# Patient Record
Sex: MALE | Race: BLACK OR AFRICAN AMERICAN | NOT HISPANIC OR LATINO | Employment: OTHER | URBAN - METROPOLITAN AREA
[De-identification: names, ages, dates, MRNs, and addresses within clinical notes are randomized per-mention and may not be internally consistent; named-entity substitution may affect disease eponyms.]

---

## 2017-06-09 ENCOUNTER — OFFICE VISIT (OUTPATIENT)
Dept: UROLOGY | Facility: CLINIC | Age: 77
End: 2017-06-09
Payer: MEDICARE

## 2017-06-09 VITALS
BODY MASS INDEX: 38.76 KG/M2 | DIASTOLIC BLOOD PRESSURE: 72 MMHG | HEIGHT: 71 IN | WEIGHT: 276.88 LBS | SYSTOLIC BLOOD PRESSURE: 122 MMHG | HEART RATE: 62 BPM

## 2017-06-09 DIAGNOSIS — R35.1 NOCTURIA MORE THAN TWICE PER NIGHT: ICD-10-CM

## 2017-06-09 DIAGNOSIS — N52.9 ED (ERECTILE DYSFUNCTION) OF ORGANIC ORIGIN: ICD-10-CM

## 2017-06-09 DIAGNOSIS — N40.1 BPH WITH OBSTRUCTION/LOWER URINARY TRACT SYMPTOMS: Primary | ICD-10-CM

## 2017-06-09 DIAGNOSIS — N13.8 BPH WITH OBSTRUCTION/LOWER URINARY TRACT SYMPTOMS: Primary | ICD-10-CM

## 2017-06-09 DIAGNOSIS — N52.9 ERECTILE DYSFUNCTION, UNSPECIFIED ERECTILE DYSFUNCTION TYPE: ICD-10-CM

## 2017-06-09 DIAGNOSIS — R35.0 URINARY FREQUENCY: ICD-10-CM

## 2017-06-09 LAB
BILIRUB SERPL-MCNC: NORMAL MG/DL
BLOOD URINE, POC: NORMAL
COLOR, POC UA: YELLOW
GLUCOSE UR QL STRIP: NORMAL
KETONES UR QL STRIP: NORMAL
LEUKOCYTE ESTERASE URINE, POC: 7
NITRITE, POC UA: NORMAL
PH, POC UA: NORMAL
PROTEIN, POC: NORMAL
SPECIFIC GRAVITY, POC UA: 1000
UROBILINOGEN, POC UA: NORMAL

## 2017-06-09 PROCEDURE — 81001 URINALYSIS AUTO W/SCOPE: CPT | Mod: S$GLB,,, | Performed by: UROLOGY

## 2017-06-09 PROCEDURE — 99999 PR PBB SHADOW E&M-EST. PATIENT-LVL III: CPT | Mod: PBBFAC,,, | Performed by: UROLOGY

## 2017-06-09 PROCEDURE — 1126F AMNT PAIN NOTED NONE PRSNT: CPT | Mod: S$GLB,,, | Performed by: UROLOGY

## 2017-06-09 PROCEDURE — 1159F MED LIST DOCD IN RCRD: CPT | Mod: S$GLB,,, | Performed by: UROLOGY

## 2017-06-09 PROCEDURE — 99214 OFFICE O/P EST MOD 30 MIN: CPT | Mod: 25,S$GLB,, | Performed by: UROLOGY

## 2017-06-09 RX ORDER — VARDENAFIL HYDROCHLORIDE 20 MG/1
20 TABLET ORAL DAILY PRN
Qty: 10 TABLET | Refills: 11 | Status: SHIPPED | OUTPATIENT
Start: 2017-06-09 | End: 2018-06-15

## 2017-06-09 RX ORDER — PREDNISONE 20 MG/1
TABLET ORAL
COMMUNITY
Start: 2017-04-13 | End: 2018-06-15

## 2018-06-15 ENCOUNTER — OFFICE VISIT (OUTPATIENT)
Dept: UROLOGY | Facility: CLINIC | Age: 78
End: 2018-06-15
Payer: MEDICARE

## 2018-06-15 VITALS
HEIGHT: 70 IN | SYSTOLIC BLOOD PRESSURE: 118 MMHG | DIASTOLIC BLOOD PRESSURE: 68 MMHG | HEART RATE: 70 BPM | BODY MASS INDEX: 38.89 KG/M2 | WEIGHT: 271.63 LBS

## 2018-06-15 DIAGNOSIS — N40.1 BPH WITH OBSTRUCTION/LOWER URINARY TRACT SYMPTOMS: Primary | ICD-10-CM

## 2018-06-15 DIAGNOSIS — R35.1 NOCTURIA MORE THAN TWICE PER NIGHT: ICD-10-CM

## 2018-06-15 DIAGNOSIS — R35.0 URINARY FREQUENCY: ICD-10-CM

## 2018-06-15 DIAGNOSIS — N52.9 ED (ERECTILE DYSFUNCTION) OF ORGANIC ORIGIN: ICD-10-CM

## 2018-06-15 DIAGNOSIS — N13.8 BPH WITH OBSTRUCTION/LOWER URINARY TRACT SYMPTOMS: Primary | ICD-10-CM

## 2018-06-15 LAB
BILIRUB SERPL-MCNC: NORMAL MG/DL
BLOOD URINE, POC: NORMAL
COLOR, POC UA: YELLOW
GLUCOSE UR QL STRIP: NORMAL
KETONES UR QL STRIP: NORMAL
LEUKOCYTE ESTERASE URINE, POC: NORMAL
NITRITE, POC UA: NORMAL
PH, POC UA: 6
PROTEIN, POC: NORMAL
SPECIFIC GRAVITY, POC UA: 1010
UROBILINOGEN, POC UA: NORMAL

## 2018-06-15 PROCEDURE — 99214 OFFICE O/P EST MOD 30 MIN: CPT | Mod: 25,S$GLB,, | Performed by: UROLOGY

## 2018-06-15 PROCEDURE — 99999 PR PBB SHADOW E&M-EST. PATIENT-LVL III: CPT | Mod: PBBFAC,,, | Performed by: UROLOGY

## 2018-06-15 PROCEDURE — 81001 URINALYSIS AUTO W/SCOPE: CPT | Mod: S$GLB,,, | Performed by: UROLOGY

## 2018-06-15 RX ORDER — TRIAMTERENE CAPSULES 50 MG/1
25 CAPSULE ORAL DAILY
COMMUNITY
End: 2019-03-13 | Stop reason: ALTCHOICE

## 2018-06-15 NOTE — PROGRESS NOTES
Subjective:       Patient ID: Alberto Monk Sr. is a 77 y.o. male who was last seen in this office Visit date not found    Chief Complaint:   Chief Complaint   Patient presents with    Benign Prostatic Hypertrophy     annual visit with psa          Benign Prostatic Hypertrophy  Patient complains of lower urinary tract symptoms. He reports nocturia two times a night. He denies frequency, straining, urgency and weak stream. Patient states symptoms are of mild severity. Onset of symptoms was several years ago and was gradual in onset.He has no personal history and no family history of prostate cancer. He reports a history of no complicating symptoms. He denies flank pain, gross hematuria, kidney stones and recurrent UTI.  He currently does not take any prostate meds.    Erectile Dysfunction  Patient complains of erectile dysfunction. Onset of dysfunction was several years ago and was gradual in onset.  Patient states the nature of difficulty is both attaining and maintaining erection. Full erections do not occur with intercourse. Partial erections occur never. Libido is not affected. Risk factors for ED include diabetes mellitus and urologic disease, bph. Patient denies history of hypogonadism. Previous treatment of ED includes Viagra, Cialis, and Levitra.  He wants to continue using Levitra.  ACTIVE MEDICAL ISSUES:  There is no problem list on file for this patient.      ALLERGIES AND MEDICATIONS: updated and reviewed.  Review of patient's allergies indicates:  No Known Allergies  Current Outpatient Prescriptions   Medication Sig    aspirin (ECOTRIN) 81 MG EC tablet Take 81 mg by mouth once daily.    glimepiride (AMARYL) 4 MG tablet Take 4 mg by mouth before breakfast.    lisinopril (PRINIVIL,ZESTRIL) 20 MG tablet     metformin (GLUCOPHAGE) 500 MG tablet     multivitamin capsule Take 1 capsule by mouth once daily.    simvastatin (ZOCOR) 20 MG tablet     triamterene (DYRENIUM) 50 MG Cap Take 25 mg by mouth  "once daily.     No current facility-administered medications for this visit.        Review of Systems   Constitutional: Negative for activity change, fatigue, fever and unexpected weight change.   HENT: Negative for congestion.    Eyes: Negative for redness.   Respiratory: Negative for chest tightness and shortness of breath.    Cardiovascular: Negative for chest pain and leg swelling.   Gastrointestinal: Negative for abdominal pain, constipation, diarrhea, nausea and vomiting.   Genitourinary: Negative for dysuria, flank pain, frequency, hematuria, penile pain, penile swelling, scrotal swelling, testicular pain and urgency.   Musculoskeletal: Negative for arthralgias and back pain.   Neurological: Negative for dizziness and light-headedness.   Psychiatric/Behavioral: Negative for behavioral problems and confusion. The patient is not nervous/anxious.    All other systems reviewed and are negative.      Objective:      Vitals:    06/15/18 1436   BP: 118/68   Pulse: 70   Weight: 123.2 kg (271 lb 9.7 oz)   Height: 5' 10" (1.778 m)     Physical Exam   Nursing note and vitals reviewed.  Constitutional: He is oriented to person, place, and time. He appears well-developed and well-nourished.   HENT:   Head: Normocephalic.   Eyes: Conjunctivae are normal.   Neck: Normal range of motion. No tracheal deviation present. No thyromegaly present.   Cardiovascular: Normal rate and normal heart sounds.    Pulmonary/Chest: Effort normal and breath sounds normal. No respiratory distress. He has no wheezes.   Abdominal: Soft. He exhibits no distension and no mass. There is no hepatosplenomegaly. There is no tenderness. There is no rebound, no guarding and no CVA tenderness. No hernia. Hernia confirmed negative in the right inguinal area and confirmed negative in the left inguinal area.   Genitourinary: Rectum normal, testes normal and penis normal. Rectal exam shows no external hemorrhoid, no mass and no tenderness. Prostate is " enlarged. Prostate is not tender. Right testis shows no mass and no tenderness. Left testis shows no mass and no tenderness.       Musculoskeletal: He exhibits no edema or tenderness.   Lymphadenopathy: No inguinal adenopathy noted on the right or left side.   Neurological: He is alert and oriented to person, place, and time.   Skin: Skin is warm and dry. No rash noted. No erythema.     Psychiatric: He has a normal mood and affect. His behavior is normal. Judgment and thought content normal.       Urine dipstick shows negative for all components.  Micro exam: negative for WBC's or RBC's.    6/8/2018  PSA 0.8    Assessment:       1. BPH with obstruction/lower urinary tract symptoms    2. Nocturia more than twice per night    3. Urinary frequency    4. ED (erectile dysfunction) of organic origin          Plan:       1. BPH with obstruction/lower urinary tract symptoms  stable  - POCT urinalysis, dipstick or tablet reag  - Prostate Specific Antigen, Diagnostic; Future    2. Nocturia more than twice per night  Limit evening fluids    3. Urinary frequency  Avoid bladder irritants    4. ED (erectile dysfunction) of organic origin  Levitra when needed            Follow-up in about 1 year (around 6/15/2019) for Follow up, Review PSA.

## 2019-03-13 ENCOUNTER — OFFICE VISIT (OUTPATIENT)
Dept: FAMILY MEDICINE | Facility: CLINIC | Age: 79
End: 2019-03-13
Payer: MEDICARE

## 2019-03-13 VITALS
WEIGHT: 261.56 LBS | DIASTOLIC BLOOD PRESSURE: 84 MMHG | HEIGHT: 70 IN | SYSTOLIC BLOOD PRESSURE: 124 MMHG | HEART RATE: 78 BPM | OXYGEN SATURATION: 98 % | TEMPERATURE: 98 F | BODY MASS INDEX: 37.44 KG/M2

## 2019-03-13 DIAGNOSIS — G47.33 OSA (OBSTRUCTIVE SLEEP APNEA): ICD-10-CM

## 2019-03-13 DIAGNOSIS — J31.0 NONALLERGIC RHINITIS: Primary | ICD-10-CM

## 2019-03-13 DIAGNOSIS — E11.9 TYPE 2 DIABETES MELLITUS WITHOUT COMPLICATION, WITHOUT LONG-TERM CURRENT USE OF INSULIN: ICD-10-CM

## 2019-03-13 DIAGNOSIS — I26.09 OTHER ACUTE PULMONARY EMBOLISM WITH ACUTE COR PULMONALE: ICD-10-CM

## 2019-03-13 DIAGNOSIS — N40.0 BENIGN PROSTATIC HYPERPLASIA, UNSPECIFIED WHETHER LOWER URINARY TRACT SYMPTOMS PRESENT: ICD-10-CM

## 2019-03-13 DIAGNOSIS — I10 ESSENTIAL HYPERTENSION: ICD-10-CM

## 2019-03-13 DIAGNOSIS — N18.30 STAGE 3 CHRONIC KIDNEY DISEASE: ICD-10-CM

## 2019-03-13 PROBLEM — E78.00 HYPERCHOLESTEROLEMIA: Status: ACTIVE | Noted: 2019-03-13

## 2019-03-13 PROBLEM — I27.20 PULMONARY HYPERTENSION: Status: ACTIVE | Noted: 2019-03-13

## 2019-03-13 PROBLEM — I27.20 PULMONARY HYPERTENSION: Status: RESOLVED | Noted: 2019-03-13 | Resolved: 2019-03-13

## 2019-03-13 PROBLEM — N18.9 CHRONIC KIDNEY DISEASE: Status: ACTIVE | Noted: 2018-12-19

## 2019-03-13 PROBLEM — R07.9 CHEST PAIN: Status: ACTIVE | Noted: 2019-03-13

## 2019-03-13 PROBLEM — R07.9 CHEST PAIN: Status: RESOLVED | Noted: 2019-03-13 | Resolved: 2019-03-13

## 2019-03-13 PROBLEM — I26.99 PULMONARY EMBOLISM: Status: ACTIVE | Noted: 2018-12-19

## 2019-03-13 PROBLEM — R60.0 EDEMA, PERIPHERAL: Status: ACTIVE | Noted: 2019-03-13

## 2019-03-13 PROBLEM — E66.9 OBESITY: Status: ACTIVE | Noted: 2018-12-19

## 2019-03-13 PROCEDURE — 1101F PT FALLS ASSESS-DOCD LE1/YR: CPT | Mod: CPTII,S$GLB,, | Performed by: INTERNAL MEDICINE

## 2019-03-13 PROCEDURE — 99999 PR PBB SHADOW E&M-EST. PATIENT-LVL III: ICD-10-PCS | Mod: PBBFAC,,, | Performed by: INTERNAL MEDICINE

## 2019-03-13 PROCEDURE — 99499 UNLISTED E&M SERVICE: CPT | Mod: S$GLB,,, | Performed by: INTERNAL MEDICINE

## 2019-03-13 PROCEDURE — 3074F PR MOST RECENT SYSTOLIC BLOOD PRESSURE < 130 MM HG: ICD-10-PCS | Mod: CPTII,S$GLB,, | Performed by: INTERNAL MEDICINE

## 2019-03-13 PROCEDURE — 99203 PR OFFICE/OUTPT VISIT, NEW, LEVL III, 30-44 MIN: ICD-10-PCS | Mod: S$GLB,,, | Performed by: INTERNAL MEDICINE

## 2019-03-13 PROCEDURE — 99499 RISK ADDL DX/OHS AUDIT: ICD-10-PCS | Mod: S$GLB,,, | Performed by: INTERNAL MEDICINE

## 2019-03-13 PROCEDURE — 99203 OFFICE O/P NEW LOW 30 MIN: CPT | Mod: S$GLB,,, | Performed by: INTERNAL MEDICINE

## 2019-03-13 PROCEDURE — 1101F PR PT FALLS ASSESS DOC 0-1 FALLS W/OUT INJ PAST YR: ICD-10-PCS | Mod: CPTII,S$GLB,, | Performed by: INTERNAL MEDICINE

## 2019-03-13 PROCEDURE — 99999 PR PBB SHADOW E&M-EST. PATIENT-LVL III: CPT | Mod: PBBFAC,,, | Performed by: INTERNAL MEDICINE

## 2019-03-13 PROCEDURE — 3079F PR MOST RECENT DIASTOLIC BLOOD PRESSURE 80-89 MM HG: ICD-10-PCS | Mod: CPTII,S$GLB,, | Performed by: INTERNAL MEDICINE

## 2019-03-13 PROCEDURE — 3074F SYST BP LT 130 MM HG: CPT | Mod: CPTII,S$GLB,, | Performed by: INTERNAL MEDICINE

## 2019-03-13 PROCEDURE — 3079F DIAST BP 80-89 MM HG: CPT | Mod: CPTII,S$GLB,, | Performed by: INTERNAL MEDICINE

## 2019-03-13 RX ORDER — APIXABAN 5 MG/1
5 TABLET, FILM COATED ORAL 2 TIMES DAILY
Refills: 6 | COMMUNITY
Start: 2019-02-25

## 2019-03-13 RX ORDER — METOPROLOL TARTRATE 50 MG/1
50 TABLET ORAL DAILY
COMMUNITY
Start: 2019-03-11

## 2019-03-13 RX ORDER — AMLODIPINE BESYLATE 5 MG/1
TABLET ORAL
Refills: 3 | COMMUNITY
Start: 2018-12-18

## 2019-03-13 RX ORDER — TAMSULOSIN HYDROCHLORIDE 0.4 MG/1
1 CAPSULE ORAL DAILY
Refills: 11 | COMMUNITY
Start: 2018-12-18 | End: 2019-06-25

## 2019-03-13 RX ORDER — FLUTICASONE PROPIONATE 50 MCG
2 SPRAY, SUSPENSION (ML) NASAL DAILY
Qty: 16 G | Refills: 11 | Status: SHIPPED | OUTPATIENT
Start: 2019-03-13 | End: 2019-06-25

## 2019-03-13 NOTE — PROGRESS NOTES
This note was created by combination of typed  and Dragon dictation.  Transcription errors may be present.  If there are any questions, please contact me.    Assessment & Plan:   Nonallergic rhinitis   -and probable eustachian tube dysfunction, started after starting up his CPAP and I wonder if this is due to CPAP use.  No evidence of sinus infection.  Neurologic exam unremarkable.  No impacted cerumen.  Trial of Flonase nasal spray nightly with his CPAP machine.  -     fluticasone (FLONASE) 50 mcg/actuation nasal spray; 2 sprays (100 mcg total) by Each Nare route once daily.  Dispense: 16 g; Refill: 11    Essential hypertension  -good blood pressure reading today, followed by primary care as well as Cardiology.    Other acute pulmonary embolism with acute cor pulmonale  -on Eliquis followed by Pulmonary, no change in V/Q scans official interpretation suggests this may not be pulmonary embolism, defer to pulmonology.    Stage 3 chronic kidney disease  Type 2 diabetes mellitus without complication, without long-term current use of insulin  -managed by primary care.    JASMIN (obstructive sleep apnea)  -on CPAP, followed by pulmonary/Sleep Medicine    Benign prostatic hyperplasia, unspecified whether lower urinary tract symptoms present  -on Flomax      Medications Discontinued During This Encounter   Medication Reason    lisinopril (PRINIVIL,ZESTRIL) 20 MG tablet Therapy completed    triamterene (DYRENIUM) 50 MG Cap Therapy completed       meds sent this encounter:  Medications Ordered This Encounter   Medications    fluticasone (FLONASE) 50 mcg/actuation nasal spray     Si sprays (100 mcg total) by Each Nare route once daily.     Dispense:  16 g     Refill:  11       Follow Up: No Follow-up on file.    Subjective:     Chief Complaint   Patient presents with    ears stopped up     HPI  Alberto is a 78 y.o. male, last appointment with this clinic was Visit date not found.    NP. Sees Shimon Cota but  didn't think he had opening.     2 weeks popping sensation in the ears bilaterally.  But won't pop back into normal. JASMIN using CPAP machine. Just started maybe 2 weeks ago.  No fever no chills. Breathing through the nose OK. Wife notes he still snores. No nasal spray. No unilateral weakness; no headache. No vision changes. No fever no chills no sinus pain, no drainage.    Patient Care Team:  Shimon Cota MD as PCP - General (Family Medicine)    Patient Active Problem List    Diagnosis Date Noted    Essential hypertension 11/2018 TTE LVH with normal systolic function.  Grade 1 diastolic dysfunction. 1/2019 TTE LVH normal systolic fxn 03/13/2019    Hypercholesterolemia 03/13/2019    Edema, peripheral 03/13/2019     Overview:   exacerbated by sodium indiscreton and amlodipine      Type 2 diabetes mellitus without complication, without long-term current use of insulin 03/01/2019    JASMIN (obstructive sleep apnea) 02/06/2019    Stage 3 chronic kidney disease 12/19/2018    Obesity 12/19/2018    Pulmonary embolism? abnormal V/Q scan 09/2018, persistent on repeat scan November 2018.  Anticoagulation.  Plan is 3 months duration and re-evaluate 12/19/2018 9/2018 V/Q with mismatch  Lower extremity Dopplers were negative 3 weeks later.  11/2018 V/Q Stable exam. There are bilateral perfusion defects. While this is intermediate probability for a pulmonary embolus, the stability over time with a normal chest x-ray would argue against pulmonary emboli.         PAST MEDICAL HISTORY:  Past Medical History:   Diagnosis Date    Diabetes mellitus     Hypertension     Hypertrophy of prostate without urinary obstruction and other lower urinary tract symptoms (LUTS)     Impotence of organic origin     Nocturia        PAST SURGICAL HISTORY:  Past Surgical History:   Procedure Laterality Date    APPENDECTOMY       Family History   Problem Relation Age of Onset    Heart disease Father     Heart disease Mother     Heart  disease Sister     No Known Problems Sister     Heart disease Sister        SOCIAL HISTORY:  Social History     Socioeconomic History    Marital status:      Spouse name: Not on file    Number of children: Not on file    Years of education: Not on file    Highest education level: Not on file   Social Needs    Financial resource strain: Not on file    Food insecurity - worry: Not on file    Food insecurity - inability: Not on file    Transportation needs - medical: Not on file    Transportation needs - non-medical: Not on file   Occupational History    Not on file   Tobacco Use    Smoking status: Never Smoker    Smokeless tobacco: Never Used   Substance and Sexual Activity    Alcohol use: No    Drug use: No    Sexual activity: No   Other Topics Concern    Not on file   Social History Narrative    Not on file       ALLERGIES AND MEDICATIONS: updated and reviewed.  Review of patient's allergies indicates:  No Known Allergies  Current Outpatient Medications   Medication Sig Dispense Refill    amLODIPine (NORVASC) 5 MG tablet TAKE ONE(1) TABLET BY MOUTH EVERY DAY  3    aspirin (ECOTRIN) 81 MG EC tablet Take 81 mg by mouth once daily.      ELIQUIS 5 mg Tab Take 5 mg by mouth 2 (two) times daily.  6    glimepiride (AMARYL) 4 MG tablet Take 4 mg by mouth before breakfast.      metformin (GLUCOPHAGE) 500 MG tablet Take 500 mg by mouth 2 (two) times daily with meals.       metoprolol tartrate (LOPRESSOR) 50 MG tablet Take 50 mg by mouth once daily.       multivitamin capsule Take 1 capsule by mouth once daily.      simvastatin (ZOCOR) 20 MG tablet Take 20 mg by mouth every evening.       tamsulosin (FLOMAX) 0.4 mg Cap Take 1 capsule by mouth once daily.  11     No current facility-administered medications for this visit.        Review of Systems   Constitutional: Negative for chills and fever.   Respiratory: Negative for shortness of breath.    Cardiovascular: Negative for chest pain and  "palpitations.   Neurological: Negative for dizziness, tingling, tremors, sensory change and headaches.       Objective:   Physical Exam   Vitals:    03/13/19 1331   BP: 124/84   Pulse: 78   Temp: 98.3 °F (36.8 °C)   SpO2: 98%   Weight: 118.6 kg (261 lb 9.2 oz)   Height: 5' 10" (1.778 m)    Body mass index is 37.53 kg/m².  Weight: 118.6 kg (261 lb 9.2 oz)   Height: 5' 10" (177.8 cm)     Physical Exam   Constitutional: He is oriented to person, place, and time. He appears well-developed and well-nourished.   HENT:   TMs grey/clear bilaterally.  OP no erythema no exudates   Eyes: EOM are normal.   Neck: Neck supple.   Cardiovascular: Normal rate, regular rhythm and normal heart sounds.   Pulmonary/Chest: Effort normal and breath sounds normal. He has no wheezes.   Lymphadenopathy:     He has no cervical adenopathy.   Neurological: He is alert and oriented to person, place, and time.   Cranial nerves 2-12 intact.  5/5 bilaterally.  Patellar DTR 1+ symmetric.  Leg extension against resistance 5/5 normal gait   Skin: Skin is warm and dry.   Psychiatric: He has a normal mood and affect. His behavior is normal.     "

## 2019-03-13 NOTE — PROGRESS NOTES
Patient, Alberto Monk Sr. (MRN #[id number]), presented with a recorded BMI of 37.53 kg/m^2 and a documented comorbidity(s):  - Diabetes Mellitus Type 2  - Obstructive Sleep Apnea  - Hypertension  to which the severe obesity is a contributing factor. This is consistent with the definition of severe obesity (BMI 35.0-39.9) with comorbidity (ICD-10 E66.01, Z68.35). The patient's severe obesity was monitored, evaluated, addressed and/or treated. This addendum to the medical record is made on 03/13/2019.

## 2019-05-24 ENCOUNTER — LAB VISIT (OUTPATIENT)
Dept: LAB | Facility: HOSPITAL | Age: 79
End: 2019-05-24
Attending: UROLOGY
Payer: MEDICARE

## 2019-05-24 DIAGNOSIS — N13.8 BPH WITH OBSTRUCTION/LOWER URINARY TRACT SYMPTOMS: ICD-10-CM

## 2019-05-24 DIAGNOSIS — N40.1 BPH WITH OBSTRUCTION/LOWER URINARY TRACT SYMPTOMS: ICD-10-CM

## 2019-05-24 LAB — COMPLEXED PSA SERPL-MCNC: 0.77 NG/ML (ref 0–4)

## 2019-05-24 PROCEDURE — 36415 COLL VENOUS BLD VENIPUNCTURE: CPT | Mod: PO

## 2019-05-24 PROCEDURE — 84153 ASSAY OF PSA TOTAL: CPT

## 2019-06-25 ENCOUNTER — OFFICE VISIT (OUTPATIENT)
Dept: UROLOGY | Facility: CLINIC | Age: 79
End: 2019-06-25
Payer: MEDICARE

## 2019-06-25 VITALS
HEIGHT: 70 IN | SYSTOLIC BLOOD PRESSURE: 122 MMHG | WEIGHT: 269.81 LBS | BODY MASS INDEX: 38.63 KG/M2 | DIASTOLIC BLOOD PRESSURE: 80 MMHG

## 2019-06-25 DIAGNOSIS — N13.8 BPH WITH OBSTRUCTION/LOWER URINARY TRACT SYMPTOMS: Primary | ICD-10-CM

## 2019-06-25 DIAGNOSIS — N40.1 BPH WITH OBSTRUCTION/LOWER URINARY TRACT SYMPTOMS: Primary | ICD-10-CM

## 2019-06-25 DIAGNOSIS — N52.9 ED (ERECTILE DYSFUNCTION) OF ORGANIC ORIGIN: ICD-10-CM

## 2019-06-25 DIAGNOSIS — R35.1 NOCTURIA MORE THAN TWICE PER NIGHT: ICD-10-CM

## 2019-06-25 DIAGNOSIS — R33.9 INCOMPLETE EMPTYING OF BLADDER: ICD-10-CM

## 2019-06-25 LAB
BILIRUB SERPL-MCNC: NORMAL MG/DL
BLOOD URINE, POC: NORMAL
COLOR, POC UA: YELLOW
GLUCOSE UR QL STRIP: NORMAL
KETONES UR QL STRIP: NORMAL
LEUKOCYTE ESTERASE URINE, POC: NORMAL
NITRITE, POC UA: NORMAL
PH, POC UA: 6
PROTEIN, POC: NORMAL
SPECIFIC GRAVITY, POC UA: 1030
UROBILINOGEN, POC UA: NORMAL

## 2019-06-25 PROCEDURE — 99214 OFFICE O/P EST MOD 30 MIN: CPT | Mod: 25,S$GLB,, | Performed by: UROLOGY

## 2019-06-25 PROCEDURE — 99214 PR OFFICE/OUTPT VISIT, EST, LEVL IV, 30-39 MIN: ICD-10-PCS | Mod: 25,S$GLB,, | Performed by: UROLOGY

## 2019-06-25 PROCEDURE — 1101F PT FALLS ASSESS-DOCD LE1/YR: CPT | Mod: CPTII,S$GLB,, | Performed by: UROLOGY

## 2019-06-25 PROCEDURE — 3079F DIAST BP 80-89 MM HG: CPT | Mod: CPTII,S$GLB,, | Performed by: UROLOGY

## 2019-06-25 PROCEDURE — 1101F PR PT FALLS ASSESS DOC 0-1 FALLS W/OUT INJ PAST YR: ICD-10-PCS | Mod: CPTII,S$GLB,, | Performed by: UROLOGY

## 2019-06-25 PROCEDURE — 3074F SYST BP LT 130 MM HG: CPT | Mod: CPTII,S$GLB,, | Performed by: UROLOGY

## 2019-06-25 PROCEDURE — 99999 PR PBB SHADOW E&M-EST. PATIENT-LVL III: ICD-10-PCS | Mod: PBBFAC,,, | Performed by: UROLOGY

## 2019-06-25 PROCEDURE — 99999 PR PBB SHADOW E&M-EST. PATIENT-LVL III: CPT | Mod: PBBFAC,,, | Performed by: UROLOGY

## 2019-06-25 PROCEDURE — 3079F PR MOST RECENT DIASTOLIC BLOOD PRESSURE 80-89 MM HG: ICD-10-PCS | Mod: CPTII,S$GLB,, | Performed by: UROLOGY

## 2019-06-25 PROCEDURE — 3074F PR MOST RECENT SYSTOLIC BLOOD PRESSURE < 130 MM HG: ICD-10-PCS | Mod: CPTII,S$GLB,, | Performed by: UROLOGY

## 2019-06-25 PROCEDURE — 81001 URINALYSIS AUTO W/SCOPE: CPT | Mod: S$GLB,,, | Performed by: UROLOGY

## 2019-06-25 PROCEDURE — 81001 PR  URINALYSIS, AUTO, W/SCOPE: ICD-10-PCS | Mod: S$GLB,,, | Performed by: UROLOGY

## 2019-06-25 RX ORDER — GUAIFENESIN/PHENYLPROPANOLAMIN
450 EXPECTORANT ORAL DAILY
COMMUNITY

## 2019-06-25 NOTE — PROGRESS NOTES
Subjective:       Patient ID: Alberto Monk Sr. is a 78 y.o. male who was last seen in this office Visit date not found    Chief Complaint:   Chief Complaint   Patient presents with    Benign Prostatic Hypertrophy     follow up visit with labwork       Benign Prostatic Hypertrophy  Patient complains of lower urinary tract symptoms. He reports nocturia two times a night. He denies frequency, straining, urgency and weak stream. Patient states symptoms are of mild severity. Onset of symptoms was several years ago and was gradual in onset.He has no personal history and no family history of prostate cancer. He reports a history of no complicating symptoms. He denies flank pain, gross hematuria, kidney stones and recurrent UTI.  He is currently taking Saw Palmetto.     Erectile Dysfunction  Patient complains of erectile dysfunction. Onset of dysfunction was several years ago and was gradual in onset.  Patient states the nature of difficulty is both attaining and maintaining erection. Full erections do not occur with intercourse. Partial erections occur never. Libido is not affected. Risk factors for ED include diabetes mellitus and urologic disease, bph. Patient denies history of hypogonadism. Previous treatment of ED includes Viagra, Cialis, and Levitra.  He wants to continue using Levitra.    ACTIVE MEDICAL ISSUES:  Patient Active Problem List   Diagnosis    Stage 3 chronic kidney disease    Type 2 diabetes mellitus without complication, without long-term current use of insulin    Essential hypertension 11/2018 TTE LVH with normal systolic function.  Grade 1 diastolic dysfunction. 1/2019 TTE LVH normal systolic fxn    Hypercholesterolemia    Obesity    JASMIN (obstructive sleep apnea)    Pulmonary embolism? abnormal V/Q scan 09/2018, persistent on repeat scan November 2018.  Anticoagulation.  Plan is 3 months duration and re-evaluate    Edema, peripheral       ALLERGIES AND MEDICATIONS: updated and  "reviewed.  Review of patient's allergies indicates:  No Known Allergies  Current Outpatient Medications   Medication Sig    amLODIPine (NORVASC) 5 MG tablet TAKE ONE(1) TABLET BY MOUTH EVERY DAY    ELIQUIS 5 mg Tab Take 5 mg by mouth 2 (two) times daily.    glimepiride (AMARYL) 4 MG tablet Take 4 mg by mouth before breakfast.    metformin (GLUCOPHAGE) 500 MG tablet Take 500 mg by mouth 2 (two) times daily with meals.     metoprolol tartrate (LOPRESSOR) 50 MG tablet Take 50 mg by mouth once daily.     saw palmetto 500 MG capsule Take 450 mg by mouth once daily.    simvastatin (ZOCOR) 20 MG tablet Take 20 mg by mouth every evening.      No current facility-administered medications for this visit.        Review of Systems   Constitutional: Negative for activity change, fatigue, fever and unexpected weight change.   HENT: Negative for congestion.    Eyes: Negative for redness.   Respiratory: Negative for chest tightness and shortness of breath.    Cardiovascular: Negative for chest pain and leg swelling.   Gastrointestinal: Negative for abdominal pain, constipation, diarrhea, nausea and vomiting.   Genitourinary: Negative for dysuria, flank pain, frequency, hematuria, penile pain, penile swelling, scrotal swelling, testicular pain and urgency.   Musculoskeletal: Negative for arthralgias and back pain.   Neurological: Negative for dizziness and light-headedness.   Psychiatric/Behavioral: Negative for behavioral problems and confusion. The patient is not nervous/anxious.    All other systems reviewed and are negative.      Objective:      Vitals:    06/25/19 1414   BP: 122/80   BP Location: Right arm   Patient Position: Sitting   BP Method: Large (Manual)   Weight: 122.4 kg (269 lb 13.5 oz)   Height: 5' 10" (1.778 m)     Physical Exam   Nursing note and vitals reviewed.  Constitutional: He is oriented to person, place, and time. He appears well-developed and well-nourished.   HENT:   Head: Normocephalic.   Eyes: " Conjunctivae are normal.   Neck: Normal range of motion. No tracheal deviation present. No thyromegaly present.   Cardiovascular: Normal rate and normal heart sounds.    Pulmonary/Chest: Effort normal and breath sounds normal. No respiratory distress. He has no wheezes.   Abdominal: Soft. He exhibits no distension and no mass. There is no hepatosplenomegaly. There is no tenderness. There is no rebound, no guarding and no CVA tenderness. No hernia. Hernia confirmed negative in the right inguinal area and confirmed negative in the left inguinal area.   Genitourinary: Rectum normal, testes normal and penis normal. Rectal exam shows no external hemorrhoid, no mass and no tenderness. Prostate is enlarged. Prostate is not tender. Right testis shows no mass and no tenderness. Left testis shows no mass and no tenderness.       Musculoskeletal: He exhibits no edema or tenderness.   Lymphadenopathy: No inguinal adenopathy noted on the right or left side.   Neurological: He is alert and oriented to person, place, and time.   Skin: Skin is warm and dry. No rash noted. No erythema.     Psychiatric: He has a normal mood and affect. His behavior is normal. Judgment and thought content normal.       Urine dipstick shows negative for all components.  Micro exam: negative for WBC's or RBC's.    PSA DIAGNOSTIC 0.00 - 4.00 ng/mL 0.77    Comment: PSA Expected levels:   Hormonal Therapy: <0.05 ng/ml   Prostatectomy: <0.01 ng/ml   Radiation Therapy: <1.00 ng/ml    Resulting Agency  [facility]      Narrative   Performed by: BRIAN   1 year      Specimen Collected: 05/24/19 10:20   Last Resulted: 05/24/19 18:11            Assessment:       1. BPH with obstruction/lower urinary tract symptoms    2. Nocturia more than twice per night    3. Incomplete emptying of bladder    4. ED (erectile dysfunction) of organic origin          Plan:       1. BPH with obstruction/lower urinary tract symptoms    - POCT urinalysis, dipstick or tablet reag  - Prostate  Specific Antigen, Diagnostic; Future    2. Nocturia more than twice per night  Limit evening fluids    3. Incomplete emptying of bladder  stable    4. ED (erectile dysfunction) of organic origin  Levitra when needed            Follow up in about 1 year (around 6/25/2020) for Follow up, Review PSA.

## 2020-06-30 ENCOUNTER — LAB VISIT (OUTPATIENT)
Dept: LAB | Facility: HOSPITAL | Age: 80
End: 2020-06-30
Attending: UROLOGY
Payer: MEDICARE

## 2020-06-30 DIAGNOSIS — N13.8 BPH WITH OBSTRUCTION/LOWER URINARY TRACT SYMPTOMS: ICD-10-CM

## 2020-06-30 DIAGNOSIS — N40.1 BPH WITH OBSTRUCTION/LOWER URINARY TRACT SYMPTOMS: ICD-10-CM

## 2020-06-30 LAB — COMPLEXED PSA SERPL-MCNC: 0.86 NG/ML (ref 0–4)

## 2020-06-30 PROCEDURE — 36415 COLL VENOUS BLD VENIPUNCTURE: CPT | Mod: PO

## 2020-06-30 PROCEDURE — 84153 ASSAY OF PSA TOTAL: CPT

## 2020-07-07 ENCOUNTER — OFFICE VISIT (OUTPATIENT)
Dept: UROLOGY | Facility: CLINIC | Age: 80
End: 2020-07-07
Payer: MEDICARE

## 2020-07-07 VITALS — BODY MASS INDEX: 39.04 KG/M2 | WEIGHT: 272.69 LBS | HEIGHT: 70 IN | TEMPERATURE: 98 F

## 2020-07-07 DIAGNOSIS — N52.9 ED (ERECTILE DYSFUNCTION) OF ORGANIC ORIGIN: ICD-10-CM

## 2020-07-07 DIAGNOSIS — R33.9 INCOMPLETE EMPTYING OF BLADDER: ICD-10-CM

## 2020-07-07 DIAGNOSIS — N13.8 BPH WITH OBSTRUCTION/LOWER URINARY TRACT SYMPTOMS: Primary | ICD-10-CM

## 2020-07-07 DIAGNOSIS — R35.1 NOCTURIA MORE THAN TWICE PER NIGHT: ICD-10-CM

## 2020-07-07 DIAGNOSIS — N40.1 BPH WITH OBSTRUCTION/LOWER URINARY TRACT SYMPTOMS: Primary | ICD-10-CM

## 2020-07-07 LAB
BILIRUB SERPL-MCNC: NORMAL MG/DL
BLOOD URINE, POC: NORMAL
COLOR, POC UA: YELLOW
GLUCOSE UR QL STRIP: NORMAL
KETONES UR QL STRIP: NORMAL
LEUKOCYTE ESTERASE URINE, POC: NORMAL
NITRITE, POC UA: NORMAL
PH, POC UA: 6
PROTEIN, POC: NORMAL
SPECIFIC GRAVITY, POC UA: 1000
UROBILINOGEN, POC UA: NORMAL

## 2020-07-07 PROCEDURE — 1126F PR PAIN SEVERITY QUANTIFIED, NO PAIN PRESENT: ICD-10-PCS | Mod: S$GLB,,, | Performed by: UROLOGY

## 2020-07-07 PROCEDURE — 99214 PR OFFICE/OUTPT VISIT, EST, LEVL IV, 30-39 MIN: ICD-10-PCS | Mod: 25,S$GLB,, | Performed by: UROLOGY

## 2020-07-07 PROCEDURE — 99999 PR PBB SHADOW E&M-EST. PATIENT-LVL III: ICD-10-PCS | Mod: PBBFAC,,, | Performed by: UROLOGY

## 2020-07-07 PROCEDURE — 81001 URINALYSIS AUTO W/SCOPE: CPT | Mod: S$GLB,,, | Performed by: UROLOGY

## 2020-07-07 PROCEDURE — 1159F MED LIST DOCD IN RCRD: CPT | Mod: S$GLB,,, | Performed by: UROLOGY

## 2020-07-07 PROCEDURE — 99214 OFFICE O/P EST MOD 30 MIN: CPT | Mod: 25,S$GLB,, | Performed by: UROLOGY

## 2020-07-07 PROCEDURE — 99999 PR PBB SHADOW E&M-EST. PATIENT-LVL III: CPT | Mod: PBBFAC,,, | Performed by: UROLOGY

## 2020-07-07 PROCEDURE — 1126F AMNT PAIN NOTED NONE PRSNT: CPT | Mod: S$GLB,,, | Performed by: UROLOGY

## 2020-07-07 PROCEDURE — 81001 POCT URINALYSIS, DIPSTICK OR TABLET REAGENT, AUTOMATED, WITH MICROSCOP: ICD-10-PCS | Mod: S$GLB,,, | Performed by: UROLOGY

## 2020-07-07 PROCEDURE — 1101F PR PT FALLS ASSESS DOC 0-1 FALLS W/OUT INJ PAST YR: ICD-10-PCS | Mod: CPTII,S$GLB,, | Performed by: UROLOGY

## 2020-07-07 PROCEDURE — 1101F PT FALLS ASSESS-DOCD LE1/YR: CPT | Mod: CPTII,S$GLB,, | Performed by: UROLOGY

## 2020-07-07 PROCEDURE — 1159F PR MEDICATION LIST DOCUMENTED IN MEDICAL RECORD: ICD-10-PCS | Mod: S$GLB,,, | Performed by: UROLOGY

## 2020-07-07 NOTE — PROGRESS NOTES
Subjective:       Patient ID: Alberto Monk Sr. is a 79 y.o. male who was last seen in this office Visit date not found    Chief Complaint:   Chief Complaint   Patient presents with    Benign Prostatic Hypertrophy     annual visit with psa        Benign Prostatic Hypertrophy  Patient complains of lower urinary tract symptoms. He reports nocturia two times a night. He denies frequency, straining, urgency and weak stream. Patient states symptoms are of mild severity. Onset of symptoms was several years ago and was gradual in onset.He has no personal history and no family history of prostate cancer. He reports a history of no complicating symptoms. He denies gross hematuria, kidney stones and recurrent UTI.  He is currently taking Saw Palmetto.  He has noted some left flank pain periodically.  He denies trauma or kidney stones.     Erectile Dysfunction  Patient complains of erectile dysfunction. Onset of dysfunction was several years ago and was gradual in onset.  Patient states the nature of difficulty is both attaining and maintaining erection. Full erections do not occur with intercourse. Partial erections occur never. Libido is not affected. Risk factors for ED include diabetes mellitus and urologic disease, bph. Patient denies history of hypogonadism. Previous treatment of ED includes Viagra, Cialis, and Levitra.  He wants to continue using Levitra.  ACTIVE MEDICAL ISSUES:  Patient Active Problem List   Diagnosis    Stage 3 chronic kidney disease    Type 2 diabetes mellitus without complication, without long-term current use of insulin    Essential hypertension 11/2018 TTE LVH with normal systolic function.  Grade 1 diastolic dysfunction. 1/2019 TTE LVH normal systolic fxn    Hypercholesterolemia    Obesity    JASMIN (obstructive sleep apnea)    Pulmonary embolism? abnormal V/Q scan 09/2018, persistent on repeat scan November 2018.  Anticoagulation.  Plan is 3 months duration and re-evaluate    Edema,  "peripheral       ALLERGIES AND MEDICATIONS: updated and reviewed.  Review of patient's allergies indicates:  No Known Allergies  Current Outpatient Medications   Medication Sig    amLODIPine (NORVASC) 5 MG tablet TAKE ONE(1) TABLET BY MOUTH EVERY DAY    ELIQUIS 5 mg Tab Take 5 mg by mouth 2 (two) times daily.    glimepiride (AMARYL) 4 MG tablet Take 4 mg by mouth before breakfast.    metformin (GLUCOPHAGE) 500 MG tablet Take 500 mg by mouth 2 (two) times daily with meals.     metoprolol tartrate (LOPRESSOR) 50 MG tablet Take 50 mg by mouth once daily.     saw palmetto 500 MG capsule Take 450 mg by mouth once daily.    simvastatin (ZOCOR) 20 MG tablet Take 20 mg by mouth every evening.      No current facility-administered medications for this visit.        Review of Systems   Constitutional: Negative for activity change, fatigue, fever and unexpected weight change.   HENT: Negative for congestion.    Eyes: Negative for redness.   Respiratory: Negative for chest tightness and shortness of breath.    Cardiovascular: Negative for chest pain and leg swelling.   Gastrointestinal: Negative for abdominal pain, constipation, diarrhea, nausea and vomiting.   Genitourinary: Negative for dysuria, flank pain, frequency, hematuria, penile pain, penile swelling, scrotal swelling, testicular pain and urgency.   Musculoskeletal: Negative for arthralgias and back pain.   Neurological: Negative for dizziness and light-headedness.   Psychiatric/Behavioral: Negative for behavioral problems and confusion. The patient is not nervous/anxious.    All other systems reviewed and are negative.      Objective:      Vitals:    07/07/20 1428   Temp: 98.3 °F (36.8 °C)   Weight: 123.7 kg (272 lb 11.3 oz)   Height: 5' 10" (1.778 m)     Physical Exam   Nursing note and vitals reviewed.  Constitutional: He is oriented to person, place, and time. He appears well-developed.   HENT:   Head: Normocephalic.   Eyes: Conjunctivae are normal.   Neck: " Normal range of motion. No tracheal deviation present. No thyromegaly present.   Cardiovascular: Normal rate and normal heart sounds.    Pulmonary/Chest: Effort normal and breath sounds normal. No respiratory distress. He has no wheezes.   Abdominal: Soft. He exhibits no distension and no mass. There is no abdominal tenderness. There is no rebound and no guarding. No hernia. Hernia confirmed negative in the right inguinal area and confirmed negative in the left inguinal area.   Genitourinary:    Testes, penis and rectum normal.   Rectum:      No rectal mass, tenderness or external hemorrhoid.   Prostate is enlarged. Prostate is not tender. Right testis shows no mass and no tenderness. Left testis shows no mass and no tenderness.       Musculoskeletal: No tenderness.   Lymphadenopathy: No inguinal adenopathy noted on the right or left side.   Neurological: He is alert and oriented to person, place, and time.   Skin: Skin is warm and dry. No rash noted. No erythema.     Psychiatric: His behavior is normal. Judgment and thought content normal.       Urine dipstick shows negative for all components.  Micro exam: negative for WBC's or RBC's.      PSA DIAGNOSTIC 0.00 - 4.00 ng/mL 0.86    Comment: PSA Expected levels:   Hormonal Therapy: <0.05 ng/ml   Prostatectomy: <0.01 ng/ml   Radiation Therapy: <1.00 ng/ml    Resulting Agency  [facility]      Narrative  Performed by: [facility]  1 year      Specimen Collected: 06/30/20 10:53   Last Resulted: 06/30/20 18:37            Assessment:       1. BPH with obstruction/lower urinary tract symptoms    2. Nocturia more than twice per night    3. Incomplete emptying of bladder    4. ED (erectile dysfunction) of organic origin          Plan:       1. BPH with obstruction/lower urinary tract symptoms  MADISON and PSA in a year  - Prostate Specific Antigen, Diagnostic; Future    2. Nocturia more than twice per night  Limit evening fluids  - POCT urinalysis, dipstick or tablet reag    3. Incomplete  emptying of bladder  I offered an ultrasound for his flank pain, he wants to see PCP    4. ED (erectile dysfunction) of organic origin  Levitra when needed            Follow up in about 1 year (around 7/7/2021) for Follow up.

## 2020-07-08 ENCOUNTER — TELEPHONE (OUTPATIENT)
Dept: UROLOGY | Facility: CLINIC | Age: 80
End: 2020-07-08

## 2020-07-08 DIAGNOSIS — R10.9 FLANK PAIN: Primary | ICD-10-CM

## 2020-07-08 NOTE — TELEPHONE ENCOUNTER
Spoke to pt ultrasound set up for 07/10/2020 at 2:15. Advised pt drive up instructions are the same-cecilia

## 2020-07-08 NOTE — TELEPHONE ENCOUNTER
----- Message from Patrizia Paz sent at 7/8/2020  9:34 AM CDT -----  Contact: Patient [phone]  Type: Patient Call Back    Who called:Patient    What is the request in detail: Pt was seen yesterday. States that he would like to have Dr Greenberg schedule the Ultrasound, which they discussed on yesterday. For left side and back pain, mainly the left side of his back. Please call pt in regards to this.     Would the patient rather a call back or a response via [facility]? Call back    Best call back number: [phone]

## 2020-07-08 NOTE — TELEPHONE ENCOUNTER
Pt called he is still having pain an would like to know if  can order the ultrasound for his lt. Sided pain. Please advise-cecilia

## 2020-07-10 ENCOUNTER — HOSPITAL ENCOUNTER (OUTPATIENT)
Dept: RADIOLOGY | Facility: HOSPITAL | Age: 80
Discharge: HOME | End: 2020-07-10
Attending: UROLOGY
Payer: MEDICARE

## 2020-07-10 DIAGNOSIS — R10.9 FLANK PAIN: ICD-10-CM

## 2020-07-10 PROCEDURE — 76770 US RETROPERITONEAL COMPLETE: ICD-10-PCS | Mod: 26,,, | Performed by: RADIOLOGY

## 2020-07-10 PROCEDURE — 76770 US EXAM ABDO BACK WALL COMP: CPT | Mod: 26,,, | Performed by: RADIOLOGY

## 2020-07-10 PROCEDURE — 76770 US EXAM ABDO BACK WALL COMP: CPT | Mod: TC

## 2020-07-13 ENCOUNTER — TELEPHONE (OUTPATIENT)
Dept: UROLOGY | Facility: CLINIC | Age: 80
End: 2020-07-13

## 2020-07-13 NOTE — TELEPHONE ENCOUNTER
Spoke to pt advise ultrasound normal no swelling,kidney stones or masses. Flank pain pt is having is not urologic if continues pt should see pcp. Pt states he fully understands.-cecilia

## 2020-07-13 NOTE — TELEPHONE ENCOUNTER
No swelling or stones in the kidneys.  No obvious cause for flank pain.      If he continues to have flank pain then he should talk to his PCP.

## 2020-07-13 NOTE — TELEPHONE ENCOUNTER
----- Message from Elliott Horner sent at 7/13/2020  3:08 PM CDT -----  Regarding: results  Type:  Test Results    Who Called: self    Name of Test (Lab/Mammo/Etc): ultrasound    Date of Test: 07-10-20    Ordering Provider: Dr. Greenberg    Where the test was performed: [facility]    Would the patient rather a call back or a response via [facility]?     Best Call Back Number: [phone]    Additional Information:      For Clinical Team:Has the provider reviewed the results?

## 2021-02-13 ENCOUNTER — IMMUNIZATION (OUTPATIENT)
Dept: FAMILY MEDICINE | Facility: CLINIC | Age: 81
End: 2021-02-13
Payer: MEDICARE

## 2021-02-13 DIAGNOSIS — Z23 NEED FOR VACCINATION: Primary | ICD-10-CM

## 2021-02-13 PROCEDURE — 91300 COVID-19, MRNA, LNP-S, PF, 30 MCG/0.3 ML DOSE VACCINE: CPT | Mod: PBBFAC | Performed by: FAMILY MEDICINE

## 2021-03-06 ENCOUNTER — IMMUNIZATION (OUTPATIENT)
Dept: FAMILY MEDICINE | Facility: CLINIC | Age: 81
End: 2021-03-06
Payer: MEDICARE

## 2021-03-06 DIAGNOSIS — Z23 NEED FOR VACCINATION: Primary | ICD-10-CM

## 2021-03-06 PROCEDURE — 91300 COVID-19, MRNA, LNP-S, PF, 30 MCG/0.3 ML DOSE VACCINE: CPT | Mod: PBBFAC | Performed by: FAMILY MEDICINE

## 2021-03-06 PROCEDURE — 0002A COVID-19, MRNA, LNP-S, PF, 30 MCG/0.3 ML DOSE VACCINE: CPT | Mod: PBBFAC | Performed by: FAMILY MEDICINE

## 2021-08-03 ENCOUNTER — OFFICE VISIT (OUTPATIENT)
Dept: UROLOGY | Facility: CLINIC | Age: 81
End: 2021-08-03
Payer: MEDICARE

## 2021-08-03 VITALS — BODY MASS INDEX: 38.94 KG/M2 | WEIGHT: 272 LBS | HEIGHT: 70 IN

## 2021-08-03 DIAGNOSIS — R35.1 NOCTURIA MORE THAN TWICE PER NIGHT: ICD-10-CM

## 2021-08-03 DIAGNOSIS — N52.9 ED (ERECTILE DYSFUNCTION) OF ORGANIC ORIGIN: ICD-10-CM

## 2021-08-03 DIAGNOSIS — N40.1 BPH WITH OBSTRUCTION/LOWER URINARY TRACT SYMPTOMS: Primary | ICD-10-CM

## 2021-08-03 DIAGNOSIS — R33.9 INCOMPLETE EMPTYING OF BLADDER: ICD-10-CM

## 2021-08-03 DIAGNOSIS — N13.8 BPH WITH OBSTRUCTION/LOWER URINARY TRACT SYMPTOMS: Primary | ICD-10-CM

## 2021-08-03 PROCEDURE — 81001 PR  URINALYSIS, AUTO, W/SCOPE: ICD-10-PCS | Mod: S$GLB,,, | Performed by: UROLOGY

## 2021-08-03 PROCEDURE — 1159F PR MEDICATION LIST DOCUMENTED IN MEDICAL RECORD: ICD-10-PCS | Mod: CPTII,S$GLB,, | Performed by: UROLOGY

## 2021-08-03 PROCEDURE — 1101F PT FALLS ASSESS-DOCD LE1/YR: CPT | Mod: CPTII,S$GLB,, | Performed by: UROLOGY

## 2021-08-03 PROCEDURE — 99213 PR OFFICE/OUTPT VISIT, EST, LEVL III, 20-29 MIN: ICD-10-PCS | Mod: S$GLB,,, | Performed by: UROLOGY

## 2021-08-03 PROCEDURE — 1160F PR REVIEW ALL MEDS BY PRESCRIBER/CLIN PHARMACIST DOCUMENTED: ICD-10-PCS | Mod: CPTII,S$GLB,, | Performed by: UROLOGY

## 2021-08-03 PROCEDURE — 1101F PR PT FALLS ASSESS DOC 0-1 FALLS W/OUT INJ PAST YR: ICD-10-PCS | Mod: CPTII,S$GLB,, | Performed by: UROLOGY

## 2021-08-03 PROCEDURE — 99999 PR PBB SHADOW E&M-EST. PATIENT-LVL III: CPT | Mod: PBBFAC,,, | Performed by: UROLOGY

## 2021-08-03 PROCEDURE — 1159F MED LIST DOCD IN RCRD: CPT | Mod: CPTII,S$GLB,, | Performed by: UROLOGY

## 2021-08-03 PROCEDURE — 3288F PR FALLS RISK ASSESSMENT DOCUMENTED: ICD-10-PCS | Mod: CPTII,S$GLB,, | Performed by: UROLOGY

## 2021-08-03 PROCEDURE — 3288F FALL RISK ASSESSMENT DOCD: CPT | Mod: CPTII,S$GLB,, | Performed by: UROLOGY

## 2021-08-03 PROCEDURE — 1160F RVW MEDS BY RX/DR IN RCRD: CPT | Mod: CPTII,S$GLB,, | Performed by: UROLOGY

## 2021-08-03 PROCEDURE — 99999 PR PBB SHADOW E&M-EST. PATIENT-LVL III: ICD-10-PCS | Mod: PBBFAC,,, | Performed by: UROLOGY

## 2021-08-03 PROCEDURE — 81001 URINALYSIS AUTO W/SCOPE: CPT | Mod: S$GLB,,, | Performed by: UROLOGY

## 2021-08-03 PROCEDURE — 99213 OFFICE O/P EST LOW 20 MIN: CPT | Mod: S$GLB,,, | Performed by: UROLOGY

## 2021-08-03 PROCEDURE — 1126F PR PAIN SEVERITY QUANTIFIED, NO PAIN PRESENT: ICD-10-PCS | Mod: CPTII,S$GLB,, | Performed by: UROLOGY

## 2021-08-03 PROCEDURE — 1126F AMNT PAIN NOTED NONE PRSNT: CPT | Mod: CPTII,S$GLB,, | Performed by: UROLOGY

## 2021-08-11 ENCOUNTER — LAB VISIT (OUTPATIENT)
Dept: LAB | Facility: HOSPITAL | Age: 81
End: 2021-08-11
Attending: UROLOGY
Payer: MEDICARE

## 2021-08-11 DIAGNOSIS — N13.8 BPH WITH OBSTRUCTION/LOWER URINARY TRACT SYMPTOMS: ICD-10-CM

## 2021-08-11 DIAGNOSIS — N40.1 BPH WITH OBSTRUCTION/LOWER URINARY TRACT SYMPTOMS: ICD-10-CM

## 2021-08-11 LAB — COMPLEXED PSA SERPL-MCNC: 1.1 NG/ML (ref 0–4)

## 2021-08-11 PROCEDURE — 36415 COLL VENOUS BLD VENIPUNCTURE: CPT | Mod: PO | Performed by: UROLOGY

## 2021-08-11 PROCEDURE — 84153 ASSAY OF PSA TOTAL: CPT | Performed by: UROLOGY

## 2021-08-23 ENCOUNTER — TELEPHONE (OUTPATIENT)
Dept: UROLOGY | Facility: CLINIC | Age: 81
End: 2021-08-23

## 2022-11-15 ENCOUNTER — OFFICE VISIT (OUTPATIENT)
Dept: UROLOGY | Facility: CLINIC | Age: 82
End: 2022-11-15
Payer: MEDICARE

## 2022-11-15 VITALS
HEIGHT: 70 IN | BODY MASS INDEX: 39.61 KG/M2 | WEIGHT: 276.69 LBS | SYSTOLIC BLOOD PRESSURE: 170 MMHG | DIASTOLIC BLOOD PRESSURE: 82 MMHG

## 2022-11-15 DIAGNOSIS — R35.1 NOCTURIA MORE THAN TWICE PER NIGHT: ICD-10-CM

## 2022-11-15 DIAGNOSIS — N52.9 ED (ERECTILE DYSFUNCTION) OF ORGANIC ORIGIN: ICD-10-CM

## 2022-11-15 DIAGNOSIS — N40.1 BPH WITH OBSTRUCTION/LOWER URINARY TRACT SYMPTOMS: Primary | ICD-10-CM

## 2022-11-15 DIAGNOSIS — N13.8 BPH WITH OBSTRUCTION/LOWER URINARY TRACT SYMPTOMS: Primary | ICD-10-CM

## 2022-11-15 PROCEDURE — 1159F PR MEDICATION LIST DOCUMENTED IN MEDICAL RECORD: ICD-10-PCS | Mod: CPTII,S$GLB,, | Performed by: UROLOGY

## 2022-11-15 PROCEDURE — 3077F SYST BP >= 140 MM HG: CPT | Mod: CPTII,S$GLB,, | Performed by: UROLOGY

## 2022-11-15 PROCEDURE — 1126F AMNT PAIN NOTED NONE PRSNT: CPT | Mod: CPTII,S$GLB,, | Performed by: UROLOGY

## 2022-11-15 PROCEDURE — 3079F PR MOST RECENT DIASTOLIC BLOOD PRESSURE 80-89 MM HG: ICD-10-PCS | Mod: CPTII,S$GLB,, | Performed by: UROLOGY

## 2022-11-15 PROCEDURE — 1160F PR REVIEW ALL MEDS BY PRESCRIBER/CLIN PHARMACIST DOCUMENTED: ICD-10-PCS | Mod: CPTII,S$GLB,, | Performed by: UROLOGY

## 2022-11-15 PROCEDURE — 99213 PR OFFICE/OUTPT VISIT, EST, LEVL III, 20-29 MIN: ICD-10-PCS | Mod: S$GLB,,, | Performed by: UROLOGY

## 2022-11-15 PROCEDURE — 1101F PT FALLS ASSESS-DOCD LE1/YR: CPT | Mod: CPTII,S$GLB,, | Performed by: UROLOGY

## 2022-11-15 PROCEDURE — 3079F DIAST BP 80-89 MM HG: CPT | Mod: CPTII,S$GLB,, | Performed by: UROLOGY

## 2022-11-15 PROCEDURE — 1159F MED LIST DOCD IN RCRD: CPT | Mod: CPTII,S$GLB,, | Performed by: UROLOGY

## 2022-11-15 PROCEDURE — 1160F RVW MEDS BY RX/DR IN RCRD: CPT | Mod: CPTII,S$GLB,, | Performed by: UROLOGY

## 2022-11-15 PROCEDURE — 99213 OFFICE O/P EST LOW 20 MIN: CPT | Mod: S$GLB,,, | Performed by: UROLOGY

## 2022-11-15 PROCEDURE — 3288F PR FALLS RISK ASSESSMENT DOCUMENTED: ICD-10-PCS | Mod: CPTII,S$GLB,, | Performed by: UROLOGY

## 2022-11-15 PROCEDURE — 3077F PR MOST RECENT SYSTOLIC BLOOD PRESSURE >= 140 MM HG: ICD-10-PCS | Mod: CPTII,S$GLB,, | Performed by: UROLOGY

## 2022-11-15 PROCEDURE — 3288F FALL RISK ASSESSMENT DOCD: CPT | Mod: CPTII,S$GLB,, | Performed by: UROLOGY

## 2022-11-15 PROCEDURE — 99999 PR PBB SHADOW E&M-EST. PATIENT-LVL III: ICD-10-PCS | Mod: PBBFAC,,, | Performed by: UROLOGY

## 2022-11-15 PROCEDURE — 1126F PR PAIN SEVERITY QUANTIFIED, NO PAIN PRESENT: ICD-10-PCS | Mod: CPTII,S$GLB,, | Performed by: UROLOGY

## 2022-11-15 PROCEDURE — 1101F PR PT FALLS ASSESS DOC 0-1 FALLS W/OUT INJ PAST YR: ICD-10-PCS | Mod: CPTII,S$GLB,, | Performed by: UROLOGY

## 2022-11-15 PROCEDURE — 99999 PR PBB SHADOW E&M-EST. PATIENT-LVL III: CPT | Mod: PBBFAC,,, | Performed by: UROLOGY

## 2022-11-15 RX ORDER — OLMESARTAN MEDOXOMIL 40 MG/1
40 TABLET ORAL DAILY
COMMUNITY

## 2022-11-15 NOTE — PROGRESS NOTES
Subjective:       Patient ID: Alberto Monk Sr. is a 82 y.o. male The patient's last visit with me was on 8/3/2021.     Chief Complaint:   Chief Complaint   Patient presents with    Annual Exam    Nocturia         Benign Prostatic Hypertrophy  Patient complains of lower urinary tract symptoms. He reports nocturia two times a night. He denies frequency, straining, urgency and weak stream. Patient states symptoms are of mild severity. Onset of symptoms was several years ago and was gradual in onset.He has no personal history and no family history of prostate cancer. He reports a history of no complicating symptoms. He denies gross hematuria, kidney stones and recurrent UTI.  He is currently taking Saw Palmetto.  He has noted some left flank pain periodically.  He denies trauma or kidney stones.    8/3/2021  He as recently diagnosed with a PE.  He is now on Eliquis.  He denies any hematuria.    11/15/2022  He is doing okay.      IPSS Questionnaire (AUA-7):  Over the past month    1)  How often have you had a sensation of not emptying your bladder completely after you finish urinating?  2 - Less than half the time   2)  How often have you had to urinate again less than two hours after you finished urinating? 1 - Less than 1 time in 5   3)  How often have you found you stopped and started again several times when you urinated?  1 - Less than 1 time in 5   4) How difficult have you found it to postpone urination?  0 - Not at all   5) How often have you had a weak urinary stream?  2 - Less than half the time   6) How often have you had to push or strain to begin urination?  1 - Less than 1 time in 5   7) How many times did you most typically get up to urinate from the time you went to bed until the time you got up in the morning?  2 - 2 times   Total score:  0-7 mildly symptomatic    8-19 moderately symptomatic    20-35 severely symptomatic         Erectile Dysfunction  Patient complains of erectile dysfunction.  Onset of dysfunction was several years ago and was gradual in onset.  Patient states the nature of difficulty is both attaining and maintaining erection. Full erections do not occur with intercourse. Partial erections occur never. Libido is not affected. Risk factors for ED include diabetes mellitus and urologic disease, bph. Patient denies history of hypogonadism. Previous treatment of ED includes Viagra, Cialis, and Levitra.  He wants to continue using Levitra.      ACTIVE MEDICAL ISSUES:  Patient Active Problem List   Diagnosis    Stage 3 chronic kidney disease    Type 2 diabetes mellitus without complication, without long-term current use of insulin    Essential hypertension 11/2018 TTE LVH with normal systolic function.  Grade 1 diastolic dysfunction. 1/2019 TTE LVH normal systolic fxn    Hypercholesterolemia    Obesity    JASMIN (obstructive sleep apnea)    Pulmonary embolism? abnormal V/Q scan 09/2018, persistent on repeat scan November 2018.  Anticoagulation.  Plan is 3 months duration and re-evaluate    Edema, peripheral       ALLERGIES AND MEDICATIONS: updated and reviewed.  Review of patient's allergies indicates:  No Known Allergies  Current Outpatient Medications   Medication Sig    ELIQUIS 5 mg Tab Take 5 mg by mouth 2 (two) times daily.    glimepiride (AMARYL) 4 MG tablet Take 4 mg by mouth before breakfast.    metformin (GLUCOPHAGE) 500 MG tablet Take 500 mg by mouth 2 (two) times daily with meals.     metoprolol tartrate (LOPRESSOR) 50 MG tablet Take 50 mg by mouth once daily.     olmesartan (BENICAR) 40 MG tablet Take 40 mg by mouth once daily.    saw palmetto 500 MG capsule Take 450 mg by mouth once daily.    simvastatin (ZOCOR) 20 MG tablet Take 20 mg by mouth every evening.     amLODIPine (NORVASC) 5 MG tablet TAKE ONE(1) TABLET BY MOUTH EVERY DAY     No current facility-administered medications for this visit.       Review of Systems   Constitutional:  Negative for activity change, fatigue, fever  "and unexpected weight change.   HENT:  Negative for congestion.    Eyes:  Negative for redness.   Respiratory:  Negative for chest tightness and shortness of breath.    Cardiovascular:  Negative for chest pain and leg swelling.   Gastrointestinal:  Negative for abdominal pain, constipation, diarrhea, nausea and vomiting.   Genitourinary:  Negative for dysuria, flank pain, frequency, hematuria, penile pain, penile swelling, scrotal swelling, testicular pain and urgency.   Musculoskeletal:  Negative for arthralgias and back pain.   Neurological:  Negative for dizziness and light-headedness.   Psychiatric/Behavioral:  Negative for behavioral problems and confusion. The patient is not nervous/anxious.    All other systems reviewed and are negative.    Objective:      Vitals:    11/15/22 1418   BP: (!) 170/82   BP Location: Left arm   Patient Position: Sitting   BP Method: X-Large (Manual)   Weight: 125.5 kg (276 lb 10.8 oz)   Height: 5' 10" (1.778 m)       Physical Exam  Vitals and nursing note reviewed.   Constitutional:       Appearance: He is well-developed.   HENT:      Head: Normocephalic.   Eyes:      Conjunctiva/sclera: Conjunctivae normal.   Neck:      Thyroid: No thyromegaly.      Trachea: No tracheal deviation.   Cardiovascular:      Rate and Rhythm: Normal rate.      Heart sounds: Normal heart sounds.   Pulmonary:      Effort: Pulmonary effort is normal. No respiratory distress.      Breath sounds: Normal breath sounds. No wheezing.   Abdominal:      General: There is no distension.      Palpations: Abdomen is soft. There is no mass.      Tenderness: There is no abdominal tenderness. There is no guarding or rebound.      Hernia: No hernia is present. There is no hernia in the right inguinal area or left inguinal area.   Genitourinary:     Penis: Normal.       Testes: Normal.         Right: Mass or tenderness not present.         Left: Mass or tenderness not present.      Prostate: Enlarged. Not tender.      " Rectum: Normal. No mass, tenderness or external hemorrhoid.   Musculoskeletal:         General: No tenderness.      Cervical back: Normal range of motion.   Lymphadenopathy:      Lower Body: No right inguinal adenopathy. No left inguinal adenopathy.   Skin:     General: Skin is warm and dry.      Findings: No erythema or rash.   Neurological:      Mental Status: He is alert and oriented to person, place, and time.   Psychiatric:         Behavior: Behavior normal.         Thought Content: Thought content normal.         Judgment: Judgment normal.       Urine dipstick shows negative for all components.  Micro exam: negative for WBC's or RBC's.      PSA DIAGNOSTIC 0.00 - 4.00 ng/mL 0.86    Comment: PSA Expected levels:   Hormonal Therapy: <0.05 ng/ml   Prostatectomy: <0.01 ng/ml   Radiation Therapy: <1.00 ng/ml    Resulting Agency  [facility]      Narrative  Performed by: [facility]  1 year      Specimen Collected: 06/30/20 10:53   Last Resulted: 06/30/20 18:37            Assessment:       1. BPH with obstruction/lower urinary tract symptoms    2. Nocturia more than twice per night    3. ED (erectile dysfunction) of organic origin            Plan:       1. BPH with obstruction/lower urinary tract symptoms  MADISON and PSA in a year  - Prostate Specific Antigen, Diagnostic; Future    2. Nocturia more than twice per night  Limit evening fluids  - POCT urinalysis, dipstick or tablet reag    3. Incomplete emptying of bladder  stable    4. ED (erectile dysfunction) of organic origin  Levitra when needed            Follow up for Review PSA, Follow up.

## 2022-11-18 ENCOUNTER — LAB VISIT (OUTPATIENT)
Dept: LAB | Facility: HOSPITAL | Age: 82
End: 2022-11-18
Attending: UROLOGY
Payer: MEDICARE

## 2022-11-18 DIAGNOSIS — N40.1 BPH WITH OBSTRUCTION/LOWER URINARY TRACT SYMPTOMS: ICD-10-CM

## 2022-11-18 DIAGNOSIS — N13.8 BPH WITH OBSTRUCTION/LOWER URINARY TRACT SYMPTOMS: ICD-10-CM

## 2022-11-18 LAB — COMPLEXED PSA SERPL-MCNC: 1.5 NG/ML (ref 0–4)

## 2022-11-18 PROCEDURE — 84153 ASSAY OF PSA TOTAL: CPT | Performed by: UROLOGY

## 2022-11-18 PROCEDURE — 36415 COLL VENOUS BLD VENIPUNCTURE: CPT | Mod: PO | Performed by: UROLOGY

## 2022-11-23 ENCOUNTER — TELEPHONE (OUTPATIENT)
Dept: UROLOGY | Facility: CLINIC | Age: 82
End: 2022-11-23
Payer: MEDICARE

## 2022-11-23 NOTE — TELEPHONE ENCOUNTER
Pt called re: lab results.  Pt notified PSA was normal.      ----- Message from Larisa Morales sent at 11/23/2022 11:51 AM CST -----  Type:  Patient Returning Call    Who Called:pt  Who Left Message for Patient:pt  Does the patient know what this is regarding?:lab results  Would the patient rather a call back or a response via [facility]? Call BAck  Best Call Back Number:[phone]  Additional Information:

## 2023-11-13 ENCOUNTER — LAB VISIT (OUTPATIENT)
Dept: LAB | Facility: HOSPITAL | Age: 83
End: 2023-11-13
Attending: UROLOGY
Payer: MEDICARE

## 2023-11-13 DIAGNOSIS — N13.8 BPH WITH OBSTRUCTION/LOWER URINARY TRACT SYMPTOMS: ICD-10-CM

## 2023-11-13 DIAGNOSIS — N40.1 BPH WITH OBSTRUCTION/LOWER URINARY TRACT SYMPTOMS: ICD-10-CM

## 2023-11-13 LAB — COMPLEXED PSA SERPL-MCNC: 0.98 NG/ML (ref 0–4)

## 2023-11-13 PROCEDURE — 84153 ASSAY OF PSA TOTAL: CPT | Performed by: UROLOGY

## 2023-11-13 PROCEDURE — 36415 COLL VENOUS BLD VENIPUNCTURE: CPT | Mod: PO | Performed by: UROLOGY

## 2023-11-21 ENCOUNTER — OFFICE VISIT (OUTPATIENT)
Dept: UROLOGY | Facility: CLINIC | Age: 83
End: 2023-11-21
Payer: MEDICARE

## 2023-11-21 VITALS — BODY MASS INDEX: 39.75 KG/M2 | WEIGHT: 277 LBS

## 2023-11-21 DIAGNOSIS — N13.8 BPH WITH OBSTRUCTION/LOWER URINARY TRACT SYMPTOMS: Primary | ICD-10-CM

## 2023-11-21 DIAGNOSIS — N52.9 ED (ERECTILE DYSFUNCTION) OF ORGANIC ORIGIN: ICD-10-CM

## 2023-11-21 DIAGNOSIS — R35.1 NOCTURIA MORE THAN TWICE PER NIGHT: ICD-10-CM

## 2023-11-21 DIAGNOSIS — N40.1 BPH WITH OBSTRUCTION/LOWER URINARY TRACT SYMPTOMS: Primary | ICD-10-CM

## 2023-11-21 PROCEDURE — 99213 PR OFFICE/OUTPT VISIT, EST, LEVL III, 20-29 MIN: ICD-10-PCS | Mod: S$GLB,,, | Performed by: UROLOGY

## 2023-11-21 PROCEDURE — 1160F RVW MEDS BY RX/DR IN RCRD: CPT | Mod: CPTII,S$GLB,, | Performed by: UROLOGY

## 2023-11-21 PROCEDURE — 1101F PT FALLS ASSESS-DOCD LE1/YR: CPT | Mod: CPTII,S$GLB,, | Performed by: UROLOGY

## 2023-11-21 PROCEDURE — 1126F AMNT PAIN NOTED NONE PRSNT: CPT | Mod: CPTII,S$GLB,, | Performed by: UROLOGY

## 2023-11-21 PROCEDURE — 3288F PR FALLS RISK ASSESSMENT DOCUMENTED: ICD-10-PCS | Mod: CPTII,S$GLB,, | Performed by: UROLOGY

## 2023-11-21 PROCEDURE — 3288F FALL RISK ASSESSMENT DOCD: CPT | Mod: CPTII,S$GLB,, | Performed by: UROLOGY

## 2023-11-21 PROCEDURE — 1160F PR REVIEW ALL MEDS BY PRESCRIBER/CLIN PHARMACIST DOCUMENTED: ICD-10-PCS | Mod: CPTII,S$GLB,, | Performed by: UROLOGY

## 2023-11-21 PROCEDURE — 1159F MED LIST DOCD IN RCRD: CPT | Mod: CPTII,S$GLB,, | Performed by: UROLOGY

## 2023-11-21 PROCEDURE — 99999 PR PBB SHADOW E&M-EST. PATIENT-LVL III: CPT | Mod: PBBFAC,,, | Performed by: UROLOGY

## 2023-11-21 PROCEDURE — 99999 PR PBB SHADOW E&M-EST. PATIENT-LVL III: ICD-10-PCS | Mod: PBBFAC,,, | Performed by: UROLOGY

## 2023-11-21 PROCEDURE — 99213 OFFICE O/P EST LOW 20 MIN: CPT | Mod: S$GLB,,, | Performed by: UROLOGY

## 2023-11-21 PROCEDURE — 1126F PR PAIN SEVERITY QUANTIFIED, NO PAIN PRESENT: ICD-10-PCS | Mod: CPTII,S$GLB,, | Performed by: UROLOGY

## 2023-11-21 PROCEDURE — 1159F PR MEDICATION LIST DOCUMENTED IN MEDICAL RECORD: ICD-10-PCS | Mod: CPTII,S$GLB,, | Performed by: UROLOGY

## 2023-11-21 PROCEDURE — 1101F PR PT FALLS ASSESS DOC 0-1 FALLS W/OUT INJ PAST YR: ICD-10-PCS | Mod: CPTII,S$GLB,, | Performed by: UROLOGY

## 2023-11-21 NOTE — PROGRESS NOTES
Subjective:       Patient ID: Alberto Monk Sr. is a 83 y.o. male The patient's last visit with me was on 11/15/2022.     Chief Complaint:   Chief Complaint   Patient presents with    Annual Exam         Benign Prostatic Hypertrophy  Patient complains of lower urinary tract symptoms. He reports nocturia two times a night. He denies frequency, straining, urgency and weak stream. Patient states symptoms are of mild severity. Onset of symptoms was several years ago and was gradual in onset.He has no personal history and no family history of prostate cancer. He reports a history of no complicating symptoms. He denies gross hematuria, kidney stones and recurrent UTI.  He is currently taking Saw Palmetto.  He has noted some left flank pain periodically.  He denies trauma or kidney stones.    8/3/2021  He as recently diagnosed with a PE.  He is now on Eliquis.  He denies any hematuria.    11/15/2022  He is doing okay.      11/21/2023  He feels well.      Erectile Dysfunction  Patient complains of erectile dysfunction. Onset of dysfunction was several years ago and was gradual in onset.  Patient states the nature of difficulty is both attaining and maintaining erection. Full erections do not occur with intercourse. Partial erections occur never. Libido is not affected. Risk factors for ED include diabetes mellitus and urologic disease, bph. Patient denies history of hypogonadism. Previous treatment of ED includes Viagra, Cialis, and Levitra.  He wants to continue using Levitra.      ACTIVE MEDICAL ISSUES:  Patient Active Problem List   Diagnosis    Stage 3 chronic kidney disease    Type 2 diabetes mellitus without complication, without long-term current use of insulin    Essential hypertension 11/2018 TTE LVH with normal systolic function.  Grade 1 diastolic dysfunction. 1/2019 TTE LVH normal systolic fxn    Hypercholesterolemia    Obesity    JASMIN (obstructive sleep apnea)    Pulmonary embolism? abnormal V/Q scan  09/2018, persistent on repeat scan November 2018.  Anticoagulation.  Plan is 3 months duration and re-evaluate    Edema, peripheral       ALLERGIES AND MEDICATIONS: updated and reviewed.  Review of patient's allergies indicates:  No Known Allergies  Current Outpatient Medications   Medication Sig    amLODIPine (NORVASC) 5 MG tablet TAKE ONE(1) TABLET BY MOUTH EVERY DAY    ELIQUIS 5 mg Tab Take 5 mg by mouth 2 (two) times daily.    glimepiride (AMARYL) 4 MG tablet Take 4 mg by mouth before breakfast.    metformin (GLUCOPHAGE) 500 MG tablet Take 500 mg by mouth 2 (two) times daily with meals.     metoprolol tartrate (LOPRESSOR) 50 MG tablet Take 50 mg by mouth once daily.     olmesartan (BENICAR) 40 MG tablet Take 40 mg by mouth once daily.    saw palmetto 500 MG capsule Take 450 mg by mouth once daily.    simvastatin (ZOCOR) 20 MG tablet Take 20 mg by mouth every evening.      No current facility-administered medications for this visit.       Review of Systems   Constitutional:  Negative for activity change, fatigue, fever and unexpected weight change.   HENT:  Negative for congestion.    Eyes:  Negative for redness.   Respiratory:  Negative for chest tightness and shortness of breath.    Cardiovascular:  Negative for chest pain and leg swelling.   Gastrointestinal:  Negative for abdominal pain, constipation, diarrhea, nausea and vomiting.   Genitourinary:  Negative for dysuria, flank pain, frequency, hematuria, penile pain, penile swelling, scrotal swelling, testicular pain and urgency.   Musculoskeletal:  Negative for arthralgias and back pain.   Neurological:  Negative for dizziness and light-headedness.   Psychiatric/Behavioral:  Negative for behavioral problems and confusion. The patient is not nervous/anxious.    All other systems reviewed and are negative.      Objective:      Vitals:    11/21/23 0854   Weight: 125.7 kg (277 lb 0.1 oz)         Physical Exam  Vitals and nursing note reviewed.   Constitutional:        Appearance: He is well-developed.   HENT:      Head: Normocephalic.   Eyes:      Conjunctiva/sclera: Conjunctivae normal.   Neck:      Thyroid: No thyromegaly.      Trachea: No tracheal deviation.   Cardiovascular:      Rate and Rhythm: Normal rate.      Heart sounds: Normal heart sounds.   Pulmonary:      Effort: Pulmonary effort is normal. No respiratory distress.      Breath sounds: Normal breath sounds. No wheezing.   Abdominal:      General: There is no distension.      Palpations: Abdomen is soft. There is no mass.      Tenderness: There is no abdominal tenderness. There is no guarding or rebound.      Hernia: No hernia is present. There is no hernia in the right inguinal area or left inguinal area.   Genitourinary:     Penis: Normal.       Testes: Normal.         Right: Mass or tenderness not present.         Left: Mass or tenderness not present.      Prostate: Enlarged. Not tender.      Rectum: Normal. No mass, tenderness or external hemorrhoid.   Musculoskeletal:         General: No tenderness.      Cervical back: Normal range of motion.   Lymphadenopathy:      Lower Body: No right inguinal adenopathy. No left inguinal adenopathy.   Skin:     General: Skin is warm and dry.      Findings: No erythema or rash.   Neurological:      Mental Status: He is alert and oriented to person, place, and time.   Psychiatric:         Behavior: Behavior normal.         Thought Content: Thought content normal.         Judgment: Judgment normal.         Urine dipstick shows negative for all components.  Micro exam: negative for WBC's or RBC's.               Component Ref Range & Units 8 d ago 1 yr ago 2 yr ago 3 yr ago 4 yr ago   PSA Diagnostic 0.00 - 4.00 ng/mL 0.98 1.5 CM 1.1 CM 0.86 CM 0.77 CM   Comment: The testing method is a chemiluminescent microparticle immunoassay  manufactured by [employer] and performed on the [employer]  or  [employer] system. Values obtained with different assay  manufacturers  for  methods may be different and cannot be used interchangeably.  PSA Expected levels:  Hormonal Therapy: <0.05 ng/ml  Prostatectomy: <0.01 ng/ml  Radiation Therapy: <1.00 ng/ml   Resulting Agency  [facility]              Narrative  Performed by: [facility]  1 year      Specimen Collected: 11/13/23 08:46 Last Resulted: 11/13/23 13:13               Assessment:       1. BPH with obstruction/lower urinary tract symptoms    2. Nocturia more than twice per night    3. ED (erectile dysfunction) of organic origin              Plan:       1. BPH with obstruction/lower urinary tract symptoms  MADISON and PSA in a year  - Prostate Specific Antigen, Diagnostic; Future    2. Nocturia more than twice per night  Limit evening fluids  - POCT urinalysis, dipstick or tablet reag    3. Incomplete emptying of bladder  stable    4. ED (erectile dysfunction) of organic origin  Levitra when needed            Follow up in about 1 year (around 11/21/2024) for Follow up Established, Review PSA.

## 2025-01-28 ENCOUNTER — OFFICE VISIT (OUTPATIENT)
Dept: UROLOGY | Facility: CLINIC | Age: 85
End: 2025-01-28
Payer: MEDICARE

## 2025-01-28 DIAGNOSIS — N13.8 BPH WITH OBSTRUCTION/LOWER URINARY TRACT SYMPTOMS: Primary | ICD-10-CM

## 2025-01-28 DIAGNOSIS — R35.1 NOCTURIA MORE THAN TWICE PER NIGHT: ICD-10-CM

## 2025-01-28 DIAGNOSIS — N52.9 ED (ERECTILE DYSFUNCTION) OF ORGANIC ORIGIN: ICD-10-CM

## 2025-01-28 DIAGNOSIS — N40.1 BPH WITH OBSTRUCTION/LOWER URINARY TRACT SYMPTOMS: Primary | ICD-10-CM

## 2025-01-28 PROCEDURE — 1159F MED LIST DOCD IN RCRD: CPT | Mod: CPTII,S$GLB,, | Performed by: UROLOGY

## 2025-01-28 PROCEDURE — 1101F PT FALLS ASSESS-DOCD LE1/YR: CPT | Mod: CPTII,S$GLB,, | Performed by: UROLOGY

## 2025-01-28 PROCEDURE — 3288F FALL RISK ASSESSMENT DOCD: CPT | Mod: CPTII,S$GLB,, | Performed by: UROLOGY

## 2025-01-28 PROCEDURE — 1126F AMNT PAIN NOTED NONE PRSNT: CPT | Mod: CPTII,S$GLB,, | Performed by: UROLOGY

## 2025-01-28 PROCEDURE — 1160F RVW MEDS BY RX/DR IN RCRD: CPT | Mod: CPTII,S$GLB,, | Performed by: UROLOGY

## 2025-01-28 PROCEDURE — 99214 OFFICE O/P EST MOD 30 MIN: CPT | Mod: S$GLB,,, | Performed by: UROLOGY

## 2025-01-28 PROCEDURE — 99999 PR PBB SHADOW E&M-EST. PATIENT-LVL II: CPT | Mod: PBBFAC,,, | Performed by: UROLOGY

## 2025-01-28 NOTE — PROGRESS NOTES
Subjective:       Patient ID: Alberto Monk Sr. is a 84 y.o. male The patient's last visit with me was on 11/21/2023.     Chief Complaint:   No chief complaint on file.        Benign Prostatic Hypertrophy  Patient complains of lower urinary tract symptoms. He reports nocturia two times a night. He denies frequency, straining, urgency and weak stream. Patient states symptoms are of mild severity. Onset of symptoms was several years ago and was gradual in onset.He has no personal history and no family history of prostate cancer. He reports a history of no complicating symptoms. He denies gross hematuria, kidney stones and recurrent UTI.  He is currently taking Saw Palmetto.  He has noted some left flank pain periodically.  He denies trauma or kidney stones.    8/3/2021  He as recently diagnosed with a PE.  He is now on Eliquis.  He denies any hematuria.    11/15/2022  He is doing okay.      11/21/2023  He feels well.    01/28/2025  He has no voiding complaints.        Erectile Dysfunction  Patient complains of erectile dysfunction. Onset of dysfunction was several years ago and was gradual in onset.  Patient states the nature of difficulty is both attaining and maintaining erection. Full erections do not occur with intercourse. Partial erections occur never. Libido is not affected. Risk factors for ED include diabetes mellitus and urologic disease, bph. Patient denies history of hypogonadism. Previous treatment of ED includes Viagra, Cialis, and Levitra.  He wants to continue using Levitra.      ACTIVE MEDICAL ISSUES:  Patient Active Problem List   Diagnosis    Stage 3 chronic kidney disease    Type 2 diabetes mellitus without complication, without long-term current use of insulin    Essential hypertension 11/2018 TTE LVH with normal systolic function.  Grade 1 diastolic dysfunction. 1/2019 TTE LVH normal systolic fxn    Hypercholesterolemia    Obesity    JASMIN (obstructive sleep apnea)    Pulmonary embolism?  abnormal V/Q scan 09/2018, persistent on repeat scan November 2018.  Anticoagulation.  Plan is 3 months duration and re-evaluate    Edema, peripheral       ALLERGIES AND MEDICATIONS: updated and reviewed.  Review of patient's allergies indicates:  No Known Allergies  Current Outpatient Medications   Medication Sig    amLODIPine (NORVASC) 5 MG tablet TAKE ONE(1) TABLET BY MOUTH EVERY DAY    ELIQUIS 5 mg Tab Take 5 mg by mouth 2 (two) times daily.    glimepiride (AMARYL) 4 MG tablet Take 4 mg by mouth before breakfast.    metformin (GLUCOPHAGE) 500 MG tablet Take 500 mg by mouth 2 (two) times daily with meals.     metoprolol tartrate (LOPRESSOR) 50 MG tablet Take 50 mg by mouth once daily.     olmesartan (BENICAR) 40 MG tablet Take 40 mg by mouth once daily.    saw palmetto 500 MG capsule Take 450 mg by mouth once daily.    simvastatin (ZOCOR) 20 MG tablet Take 20 mg by mouth every evening.      No current facility-administered medications for this visit.       Review of Systems   Constitutional:  Negative for activity change, fatigue, fever and unexpected weight change.   HENT:  Negative for congestion.    Eyes:  Negative for redness.   Respiratory:  Negative for chest tightness and shortness of breath.    Cardiovascular:  Negative for chest pain and leg swelling.   Gastrointestinal:  Negative for abdominal pain, constipation, diarrhea, nausea and vomiting.   Genitourinary:  Negative for dysuria, flank pain, frequency, hematuria, penile pain, penile swelling, scrotal swelling, testicular pain and urgency.   Musculoskeletal:  Negative for arthralgias and back pain.   Neurological:  Negative for dizziness and light-headedness.   Psychiatric/Behavioral:  Negative for behavioral problems and confusion. The patient is not nervous/anxious.    All other systems reviewed and are negative.      Objective:      There were no vitals filed for this visit.        Physical Exam  Vitals and nursing note reviewed.   Constitutional:        Appearance: He is well-developed.   HENT:      Head: Normocephalic.   Eyes:      Conjunctiva/sclera: Conjunctivae normal.   Neck:      Thyroid: No thyromegaly.      Trachea: No tracheal deviation.   Cardiovascular:      Rate and Rhythm: Normal rate.      Heart sounds: Normal heart sounds.   Pulmonary:      Effort: Pulmonary effort is normal. No respiratory distress.      Breath sounds: Normal breath sounds. No wheezing.   Abdominal:      General: There is no distension.      Palpations: Abdomen is soft. There is no mass.      Tenderness: There is no abdominal tenderness. There is no guarding or rebound.      Hernia: No hernia is present. There is no hernia in the right inguinal area or left inguinal area.   Genitourinary:     Penis: Normal.       Testes: Normal.         Right: Mass or tenderness not present.         Left: Mass or tenderness not present.      Prostate: Enlarged. Not tender.      Rectum: Normal. No mass, tenderness or external hemorrhoid.   Musculoskeletal:         General: No tenderness.      Cervical back: Normal range of motion.   Lymphadenopathy:      Lower Body: No right inguinal adenopathy. No left inguinal adenopathy.   Skin:     General: Skin is warm and dry.      Findings: No erythema or rash.   Neurological:      Mental Status: He is alert and oriented to person, place, and time.   Psychiatric:         Behavior: Behavior normal.         Thought Content: Thought content normal.         Judgment: Judgment normal.         Urine dipstick shows negative for all components.  Micro exam: negative for WBC's or RBC's.               Component Ref Range & Units 8 d ago 1 yr ago 2 yr ago 3 yr ago 4 yr ago   PSA Diagnostic 0.00 - 4.00 ng/mL 0.98 1.5 CM 1.1 CM 0.86 CM 0.77 CM   Comment: The testing method is a chemiluminescent microparticle immunoassay  manufactured by [employer] and performed on the [employer]  or  Am[employer] system. Values obtained with different assay  manufacturers  for  methods may be different and cannot be used interchangeably.  PSA Expected levels:  Hormonal Therapy: <0.05 ng/ml  Prostatectomy: <0.01 ng/ml  Radiation Therapy: <1.00 ng/ml   Resulting Agency  [facility]              Narrative  Performed by: BRIAN  1 year      Specimen Collected: 11/13/23 08:46 Last Resulted: 11/13/23 13:13               Assessment:       1. BPH with obstruction/lower urinary tract symptoms    2. Nocturia more than twice per night    3. ED (erectile dysfunction) of organic origin                Plan:       1. BPH with obstruction/lower urinary tract symptoms  MADISON and PSA in a year  - Prostate Specific Antigen, Diagnostic; Future    2. Nocturia more than twice per night  Limit evening fluids  - POCT urinalysis, dipstick or tablet reag    3. Incomplete emptying of bladder  stable    4. ED (erectile dysfunction) of organic origin  Levitra when needed            Follow up in about 1 year (around 1/28/2026) for Follow up Established, Review PSA.

## 2025-02-04 ENCOUNTER — LAB VISIT (OUTPATIENT)
Dept: LAB | Facility: HOSPITAL | Age: 85
End: 2025-02-04
Attending: UROLOGY
Payer: MEDICARE

## 2025-02-04 DIAGNOSIS — N13.8 BPH WITH OBSTRUCTION/LOWER URINARY TRACT SYMPTOMS: ICD-10-CM

## 2025-02-04 DIAGNOSIS — N40.1 BPH WITH OBSTRUCTION/LOWER URINARY TRACT SYMPTOMS: ICD-10-CM

## 2025-02-04 LAB — COMPLEXED PSA SERPL-MCNC: 1.3 NG/ML (ref 0–4)

## 2025-02-04 PROCEDURE — 36415 COLL VENOUS BLD VENIPUNCTURE: CPT | Mod: PO | Performed by: UROLOGY

## 2025-02-04 PROCEDURE — 84153 ASSAY OF PSA TOTAL: CPT | Performed by: UROLOGY

## 2025-07-14 ENCOUNTER — NURSE TRIAGE (OUTPATIENT)
Dept: ADMINISTRATIVE | Facility: CLINIC | Age: 85
End: 2025-07-14
Payer: MEDICARE

## 2025-07-14 ENCOUNTER — PATIENT OUTREACH (OUTPATIENT)
Facility: OTHER | Age: 85
End: 2025-07-14
Payer: MEDICARE

## 2025-07-14 ENCOUNTER — TELEPHONE (OUTPATIENT)
Dept: UROLOGY | Facility: CLINIC | Age: 85
End: 2025-07-14
Payer: MEDICARE

## 2025-07-14 ENCOUNTER — HOSPITAL ENCOUNTER (EMERGENCY)
Facility: HOSPITAL | Age: 85
Discharge: HOME | End: 2025-07-15
Attending: EMERGENCY MEDICINE
Payer: MEDICARE

## 2025-07-14 ENCOUNTER — OCHSNER VIRTUAL EMERGENCY DEPARTMENT (OUTPATIENT)
Facility: CLINIC | Age: 85
End: 2025-07-14
Payer: MEDICARE

## 2025-07-14 DIAGNOSIS — N47.1 PHIMOSIS: Primary | ICD-10-CM

## 2025-07-14 DIAGNOSIS — I10 HYPERTENSION: ICD-10-CM

## 2025-07-14 PROCEDURE — 87086 URINE CULTURE/COLONY COUNT: CPT | Performed by: EMERGENCY MEDICINE

## 2025-07-14 PROCEDURE — 81003 URINALYSIS AUTO W/O SCOPE: CPT | Performed by: EMERGENCY MEDICINE

## 2025-07-14 PROCEDURE — 93005 ELECTROCARDIOGRAM TRACING: CPT

## 2025-07-14 PROCEDURE — 93010 ELECTROCARDIOGRAM REPORT: CPT | Mod: ,,, | Performed by: INTERNAL MEDICINE

## 2025-07-14 RX ORDER — TRIAMCINOLONE ACETONIDE 0.25 MG/G
CREAM TOPICAL 2 TIMES DAILY
Qty: 1 EACH | Refills: 0 | Status: SHIPPED | OUTPATIENT
Start: 2025-07-14 | End: 2025-07-24 | Stop reason: ALTCHOICE

## 2025-07-14 RX ORDER — CLOTRIMAZOLE 1 %
CREAM (GRAM) TOPICAL
Qty: 15 G | Refills: 0 | Status: SHIPPED | OUTPATIENT
Start: 2025-07-14 | End: 2025-07-24 | Stop reason: ALTCHOICE

## 2025-07-14 NOTE — PLAN OF CARE-OVED
[facility]  Referral Source: Nurse On-Call                               Chief Complaint   Patient presents with    Groin Swelling     Swelling of foreskin.  Urine goes in a "different direction" than aim.  Malodorous urine.  Can't fully retract it.  "Extra skin is tight and swollen." Glans is covered, foreskin is not retracted.     Per OOC RN, glans penis is covered with foreskin, so not paraphimosis.  Likely phimosis and balanitis +/- UTI.    Recommendation: Emergency Department            Emergency Department: [employer]             Patient will go after work.               "

## 2025-07-14 NOTE — TELEPHONE ENCOUNTER
"Pt stated he is calling for an appt with MD. Pt stated he was never circumcised and the extra skin is tight and swollen. It uncomfortable after urinating. His urine goes into a different direction from where his aiming. Bad smelling urine. He believes its because he can't clean it. Care advice recommends pt see MD today. Pt is requesting to be seen tomorrow. [facility] provider Dr Ahuja notified and stated "er please if urology cannot see today.  is he able to retract his foreskin?" Pt stated he could pull the foreskin back but not much as before. No appts until next week. Dr Ahuja stated for pt to go to ER. Pt asked if he could go tonight after work. Dr Ahuja stated "since his foreskin is partially retractable, yes. If it was fully retracted but unable to be pulled back over the head of the penis, he would need to go immediately." Pt stated the head is cover. He can pull the foreskin back and return it back to original stated but it doesn't go back as for as it use to. Pt verbalized understanding of everything. Pt was given a urology appt for next week and will go to the ER tonight.   Reason for Disposition   Bad or foul-smelling urine    Additional Information   Negative: Shock suspected (e.g., cold/pale/clammy skin, too weak to stand, low BP, rapid pulse)   Negative: Sounds like a life-threatening emergency to the triager   Negative: Unable to urinate (or only a few drops) > 4 hours and bladder feels very full (e.g., palpable bladder or strong urge to urinate)   Negative: Decreased urination and drinking very little and dehydration suspected (e.g., dark urine, no urine > 12 hours, very dry mouth, very lightheaded)   Negative: Patient sounds very sick or weak to the triager   Negative: Fever > 100.4 F  (38.0 C)   Negative: Side (flank) or lower back pain present    Protocols used: Urinary Symptoms-A-OH    "

## 2025-07-14 NOTE — PROGRESS NOTES
"Patient spoke with OOC RN on 7/14/2025 with complaint of "Pt stated he is calling for an appt with MD. Pt stated he was never circumcised and the extra skin is tight and swollen. It uncomfortable after urinating. His urine goes into a different direction from where his aiming. Bad smelling urine."    OOC RN consulted with [place] provider, Dr. Ahuja, and disposition recommended was emergency department.  Will follow up with patient during the week of July 15-19, 2025 to assess if patient received the care he was needing.      "

## 2025-07-15 ENCOUNTER — TELEPHONE (OUTPATIENT)
Dept: UROLOGY | Facility: CLINIC | Age: 85
End: 2025-07-15
Payer: MEDICARE

## 2025-07-15 ENCOUNTER — PATIENT OUTREACH (OUTPATIENT)
Facility: OTHER | Age: 85
End: 2025-07-15
Payer: MEDICARE

## 2025-07-15 VITALS
RESPIRATION RATE: 18 BRPM | HEIGHT: 70 IN | SYSTOLIC BLOOD PRESSURE: 181 MMHG | TEMPERATURE: 99 F | OXYGEN SATURATION: 98 % | WEIGHT: 275 LBS | BODY MASS INDEX: 39.37 KG/M2 | HEART RATE: 61 BPM | DIASTOLIC BLOOD PRESSURE: 86 MMHG

## 2025-07-15 LAB
BACTERIA #/AREA URNS AUTO: ABNORMAL /HPF
BACTERIA UR CULT: NORMAL
BILIRUB UR QL STRIP.AUTO: NEGATIVE
CLARITY UR: CLEAR
COLOR UR AUTO: YELLOW
GLUCOSE UR QL STRIP: ABNORMAL
HGB UR QL STRIP: ABNORMAL
HYALINE CASTS UR QL AUTO: 2 /LPF (ref 0–1)
KETONES UR QL STRIP: NEGATIVE
LEUKOCYTE ESTERASE UR QL STRIP: ABNORMAL
MICROSCOPIC COMMENT: ABNORMAL
NITRITE UR QL STRIP: NEGATIVE
OHS QRS DURATION: 84 MS
OHS QTC CALCULATION: 415 MS
PH UR STRIP: 6 [PH]
PROT UR QL STRIP: ABNORMAL
RBC #/AREA URNS AUTO: 6 /HPF (ref 0–4)
SP GR UR STRIP: 1.02
SQUAMOUS #/AREA URNS AUTO: 4 /HPF
UROBILINOGEN UR STRIP-ACNC: ABNORMAL EU/DL
WBC #/AREA URNS AUTO: 16 /HPF (ref 0–5)
YEAST UR QL AUTO: ABNORMAL /HPF

## 2025-07-15 PROCEDURE — 99284 EMERGENCY DEPT VISIT MOD MDM: CPT

## 2025-07-15 NOTE — ED NOTES
Pt states he has not taken his nightly blood pressure medications. Pt asymptomatic with HTN.   
none

## 2025-07-15 NOTE — DISCHARGE INSTRUCTIONS
Clean the area well. You can try to gently retract/stretch the foreskin but do not force it, this could lead to severe complications.  You can cleanse the area twice daily with gentle saline or water with mild soap.  You can take a cotton swab and clean the areas you cannot get to  Apply topical antibiotic and steroid as prescribed    If you cannot urinate, come straight to the emergency department.

## 2025-07-15 NOTE — PROGRESS NOTES
Spoke with pt today for a follow-up call, after speaking with OOC line, consulting [place], and ER visit on yesterday (7/14). Pt expressed he is doing much better and the ER was able to assist him. Pt revealed he was prescribed 2 different creams from the ER. Pt is curious if he could possibly get his urology appointment a little earlier in the day. Pt is aware the clinic will be contacted to see if they could work this out for him. Pt was very satisfied with all the care he received. The following was sent to Dr. Greenberg's staff: Good afternoon, pt has an upcoming appointment next week for 4:00 p.m. on 7/24. Pt is wondering if there is anyway he can be seen earlier on this day. If not, pt will make arrangements, but was wondering if this could be possible. Please reach out to pt to discuss. Thanks in advance! Pt has no other needs. Next follow-up scheduled for 7/23/2025, as well as appointment reminder.    Peral Resendez  ED Navigator  [phone]

## 2025-07-15 NOTE — ED PROVIDER NOTES
Encounter Date: 7/14/2025       History     Chief Complaint   Patient presents with    Groin Swelling     States that for about 2 weeks his foreskin has been swelling and causing him pain. Denies any issues urinating.      HPI  84-year-old male who presents with difficulty retracting his foreskin.  Started about 2 weeks ago.  States that he is still able to urinate however it feels like the whole is getting smaller and smaller.  When he urinates it goes at a strange angle.  Last urinated earlier today.  No fevers or chills.  No abdominal pain.  No dysuria, urgency.  Does have some mild frequency.  No penile discharge.  He reports very mild swelling.  He does clean the area.  Was never circumcised.  Review of patient's allergies indicates:  No Known Allergies  Past Medical History:   Diagnosis Date    Diabetes mellitus     Hypertension     Hypertrophy of prostate without urinary obstruction and other lower urinary tract symptoms (LUTS)     Impotence of organic origin     Nocturia      Past Surgical History:   Procedure Laterality Date    APPENDECTOMY       Family History   Problem Relation Name Age of Onset    Heart disease Father      Heart disease Mother      Heart disease Sister      No Known Problems Sister      Heart disease Sister       Social History[1]  Review of Systems    Physical Exam     Initial Vitals [07/14/25 2225]   BP Pulse Resp Temp SpO2   (!) 220/95 66 18 98.5 °F (36.9 °C) 98 %      MAP       --         Physical Exam    Nursing note and vitals reviewed.  Constitutional: He appears well-developed and well-nourished. No distress.   HENT:   Head: Normocephalic and atraumatic.   Nose: Nose normal.   Eyes: Conjunctivae and EOM are normal. Pupils are equal, round, and reactive to light.   Neck:   Normal range of motion.  Cardiovascular:  Normal heart sounds.           Pulmonary/Chest: Breath sounds normal. No respiratory distress.   Abdominal: Abdomen is soft. He exhibits no distension. There is no  abdominal tenderness.   Genitourinary:    Genitourinary Comments: The foreskin is difficult to retract.  I am able to see the tip of the glans and the urethral meatus.  There is some mild redness and swelling of the foreskin.  No paraphimosis.     Musculoskeletal:         General: Normal range of motion.      Cervical back: Normal range of motion.     Neurological: He is alert and oriented to person, place, and time.   Skin: Skin is warm and dry.         ED Course   Procedures  Labs Reviewed   HEPATITIS C ANTIBODY   HEP C VIRUS HOLD SPECIMEN   HIV 1 / 2 ANTIBODY   URINALYSIS, REFLEX TO URINE CULTURE          Imaging Results    None          Medications - No data to display  Medical Decision Making  84-year-old male who presents with difficulty retracting foreskin.  On exam, he is able to retract enough that there was not any urinary retention.  There is some mild erythema and swelling.  No paraphimosis.  Likely phimosis based on exam.  Could have some mild balanitis as well.  We will treat with instructions on cleansing at home, gentle retraction but avoiding forceful retraction, this antifungal and topical steroid creams, urology follow up.  Return precautions given.  Low suspicion for UTI but urinalysis sent.  Patient would like to leave before results.  We will call if positive.    Risk  Prescription drug management.                                          Clinical Impression:  Final diagnoses:  [I10] Hypertension  [N47.1] Phimosis (Primary)                       [1]   Social History  Tobacco Use    Smoking status: Never    Smokeless tobacco: Never   Substance Use Topics    Alcohol use: No    Drug use: No        Janny Perez MD  07/15/25 0001

## 2025-07-15 NOTE — ED TRIAGE NOTES
Alberto Monk Sr., a 84 y.o. male presents to the ED w/ complaint of     Triage note:  Chief Complaint   Patient presents with    Groin Swelling     States that for about 2 weeks his foreskin has been swelling and causing him pain. Denies any issues urinating.      Review of patient's allergies indicates:  No Known Allergies  Past Medical History:   Diagnosis Date    Diabetes mellitus     Hypertension     Hypertrophy of prostate without urinary obstruction and other lower urinary tract symptoms (LUTS)     Impotence of organic origin     Nocturia

## 2025-07-16 LAB — HOLD SPECIMEN: NORMAL

## 2025-07-23 ENCOUNTER — PATIENT OUTREACH (OUTPATIENT)
Facility: OTHER | Age: 85
End: 2025-07-23
Payer: MEDICARE

## 2025-07-23 NOTE — PROGRESS NOTES
Spoke with patient to remind about upcoming urology appointment scheduled for July 24, 2025 at 4 pm with Torito Greenberg MD.  Patient confirmed appointment but requesting clarification on time.  Patient under the impression appointment supposed to be scheduled for 10:45 am.  In-basket message sent to Torito Greenberg MD for patient call back for further assistance.    Will follow up with patient during the week of July 25-30, 2025 to assess for any additional concerns/needs to be addressed.

## 2025-07-23 NOTE — PROGRESS NOTES
Subjective:       Patient ID: Alberto Monk Sr. is a 84 y.o. male The patient's last visit with me was on 11/21/2023.     Chief Complaint:   Chief Complaint   Patient presents with    Penis Pain     Tight skin on penis     Phimosis  He went to the ED recently due to penile pain and difficulty retracting his foreskin.  He has been using triamcinolone and lotrimin creams but it is not helping.     Benign Prostatic Hypertrophy  Patient complains of lower urinary tract symptoms. He reports nocturia two times a night. He denies frequency, straining, urgency and weak stream. Patient states symptoms are of mild severity. Onset of symptoms was several years ago and was gradual in onset.He has no personal history and no family history of prostate cancer. He reports a history of no complicating symptoms. He denies gross hematuria, kidney stones and recurrent UTI.  He is currently taking Saw Palmetto.  He has noted some left flank pain periodically.  He denies trauma or kidney stones.    8/3/2021  He as recently diagnosed with a PE.  He is now on Eliquis.  He denies any hematuria.    11/15/2022  He is doing okay.      11/21/2023  He feels well.    07/24/2025  He has no voiding complaints.        Erectile Dysfunction  Patient complains of erectile dysfunction. Onset of dysfunction was several years ago and was gradual in onset.  Patient states the nature of difficulty is both attaining and maintaining erection. Full erections do not occur with intercourse. Partial erections occur never. Libido is not affected. Risk factors for ED include diabetes mellitus and urologic disease, bph. Patient denies history of hypogonadism. Previous treatment of ED includes Viagra, Cialis, and Levitra.  He wants to continue using Levitra.      ACTIVE MEDICAL ISSUES:  Patient Active Problem List   Diagnosis    Stage 3 chronic kidney disease    Type 2 diabetes mellitus without complication, without long-term current use of insulin    Essential  hypertension 11/2018 TTE LVH with normal systolic function.  Grade 1 diastolic dysfunction. 1/2019 TTE LVH normal systolic fxn    Hypercholesterolemia    Obesity    JASMIN (obstructive sleep apnea)    Pulmonary embolism? abnormal V/Q scan 09/2018, persistent on repeat scan November 2018.  Anticoagulation.  Plan is 3 months duration and re-evaluate    Edema, peripheral       ALLERGIES AND MEDICATIONS: updated and reviewed.  Review of patient's allergies indicates:  No Known Allergies  Current Outpatient Medications   Medication Sig    amLODIPine (NORVASC) 5 MG tablet TAKE ONE(1) TABLET BY MOUTH EVERY DAY    ELIQUIS 5 mg Tab Take 5 mg by mouth 2 (two) times daily.    glimepiride (AMARYL) 4 MG tablet Take 4 mg by mouth before breakfast.    metformin (GLUCOPHAGE) 500 MG tablet Take 500 mg by mouth 2 (two) times daily with meals.     metoprolol tartrate (LOPRESSOR) 50 MG tablet Take 50 mg by mouth once daily.     olmesartan (BENICAR) 40 MG tablet Take 40 mg by mouth once daily.    saw palmetto 500 MG capsule Take 450 mg by mouth once daily.    simvastatin (ZOCOR) 20 MG tablet Take 20 mg by mouth every evening.     cephALEXin (KEFLEX) 500 MG capsule Take 1 capsule (500 mg total) by mouth every 8 (eight) hours. for 7 days    clotrimazole-betamethasone 1-0.05% (LOTRISONE) cream Apply topically 2 (two) times daily.     No current facility-administered medications for this visit.       Review of Systems   Constitutional:  Negative for activity change, chills, fatigue, fever and unexpected weight change.   HENT:  Negative for congestion.    Eyes:  Negative for redness.   Respiratory:  Negative for chest tightness and shortness of breath.    Cardiovascular:  Negative for chest pain, palpitations and leg swelling.   Gastrointestinal:  Negative for abdominal pain, constipation, diarrhea, nausea and vomiting.   Genitourinary:  Positive for penile pain. Negative for difficulty urinating, dysuria, flank pain, frequency, hematuria,  penile swelling, scrotal swelling, testicular pain and urgency.   Musculoskeletal:  Negative for arthralgias and back pain.   Neurological:  Negative for dizziness and light-headedness.   Psychiatric/Behavioral:  Negative for behavioral problems and confusion. The patient is not nervous/anxious.    All other systems reviewed and are negative.      Objective:      Vitals:    07/24/25 1032   Weight: 121.1 kg (266 lb 15.6 oz)           Physical Exam  Vitals and nursing note reviewed.   Constitutional:       Appearance: He is well-developed.   HENT:      Head: Normocephalic.   Eyes:      Conjunctiva/sclera: Conjunctivae normal.   Neck:      Thyroid: No thyromegaly.      Trachea: No tracheal deviation.   Cardiovascular:      Rate and Rhythm: Normal rate.      Heart sounds: Normal heart sounds.   Pulmonary:      Effort: Pulmonary effort is normal. No respiratory distress.      Breath sounds: Normal breath sounds. No wheezing.   Abdominal:      General: There is no distension.      Palpations: Abdomen is soft. There is no mass.      Tenderness: There is no abdominal tenderness. There is no guarding or rebound.      Hernia: No hernia is present. There is no hernia in the right inguinal area or left inguinal area.   Genitourinary:     Penis: Normal.       Testes: Normal.         Right: Mass or tenderness not present.         Left: Mass or tenderness not present.      Prostate: Enlarged. Not tender.      Rectum: Normal. No mass, tenderness or external hemorrhoid.   Musculoskeletal:         General: No tenderness.      Cervical back: Normal range of motion.   Lymphadenopathy:      Lower Body: No right inguinal adenopathy. No left inguinal adenopathy.   Skin:     General: Skin is warm and dry.      Findings: No erythema or rash.   Neurological:      Mental Status: He is alert and oriented to person, place, and time.   Psychiatric:         Behavior: Behavior normal.         Thought Content: Thought content normal.          Judgment: Judgment normal.         Urine dipstick shows negative for all components.  Micro exam: negative for WBC's or RBC's.      Component  Ref Range & Units (hover) 5 mo ago 1 yr ago 2 yr ago 3 yr ago 5 yr ago 6 yr ago   PSA Diagnostic 1.3 0.98 CM 1.5 CM 1.1 CM 0.86 CM 0.77 CM   Comment: The testing method is a chemiluminescent microparticle immunoassay  manufactured by [employer] and performed on the [employer]  or  [employer] system. Values obtained with different assay manufacturers  for  methods may be different and cannot be used interchangeably.  PSA Expected levels:  Hormonal Therapy: <0.05 ng/ml  Prostatectomy: <0.01 ng/ml  Radiation Therapy: <1.00 ng/ml   Resulting Agency [facility]              Narrative  Performed by: BRIAN  1 year   Specimen Collected: 02/04/25 11:32 CST Last Resulted: 02/04/25 17:04 CST           Assessment:       1. Phimosis    2. BPH with obstruction/lower urinary tract symptoms    3. Nocturia more than twice per night    4. Diabetes mellitus without complication                  Plan:       1. Phimosis (Primary)  May need a circumcison    - Ambulatory referral/consult to Urology  - clotrimazole-betamethasone 1-0.05% (LOTRISONE) cream; Apply topically 2 (two) times daily.  Dispense: 45 g; Refill: 0  - cephALEXin (KEFLEX) 500 MG capsule; Take 1 capsule (500 mg total) by mouth every 8 (eight) hours. for 7 days  Dispense: 21 capsule; Refill: 0    2. BPH with obstruction/lower urinary tract symptoms  stable    3. Nocturia more than twice per night  Limit evening fluids    4. Diabetes mellitus without complication    - Hemoglobin A1C; Future             Follow up in about 6 weeks (around 9/4/2025) for Follow up Established.

## 2025-07-24 ENCOUNTER — OFFICE VISIT (OUTPATIENT)
Dept: UROLOGY | Facility: CLINIC | Age: 85
End: 2025-07-24
Payer: MEDICARE

## 2025-07-24 VITALS — WEIGHT: 267 LBS | BODY MASS INDEX: 38.31 KG/M2

## 2025-07-24 DIAGNOSIS — N13.8 BPH WITH OBSTRUCTION/LOWER URINARY TRACT SYMPTOMS: ICD-10-CM

## 2025-07-24 DIAGNOSIS — N47.1 PHIMOSIS: Primary | ICD-10-CM

## 2025-07-24 DIAGNOSIS — N40.1 BPH WITH OBSTRUCTION/LOWER URINARY TRACT SYMPTOMS: ICD-10-CM

## 2025-07-24 DIAGNOSIS — E11.9 DIABETES MELLITUS WITHOUT COMPLICATION: ICD-10-CM

## 2025-07-24 DIAGNOSIS — R35.1 NOCTURIA MORE THAN TWICE PER NIGHT: ICD-10-CM

## 2025-07-24 PROCEDURE — 1159F MED LIST DOCD IN RCRD: CPT | Mod: CPTII,S$GLB,, | Performed by: UROLOGY

## 2025-07-24 PROCEDURE — 3288F FALL RISK ASSESSMENT DOCD: CPT | Mod: CPTII,S$GLB,, | Performed by: UROLOGY

## 2025-07-24 PROCEDURE — 87086 URINE CULTURE/COLONY COUNT: CPT | Performed by: UROLOGY

## 2025-07-24 PROCEDURE — 1101F PT FALLS ASSESS-DOCD LE1/YR: CPT | Mod: CPTII,S$GLB,, | Performed by: UROLOGY

## 2025-07-24 PROCEDURE — 1160F RVW MEDS BY RX/DR IN RCRD: CPT | Mod: CPTII,S$GLB,, | Performed by: UROLOGY

## 2025-07-24 PROCEDURE — 99999 PR PBB SHADOW E&M-EST. PATIENT-LVL II: CPT | Mod: PBBFAC,,, | Performed by: UROLOGY

## 2025-07-24 PROCEDURE — 99214 OFFICE O/P EST MOD 30 MIN: CPT | Mod: S$GLB,,, | Performed by: UROLOGY

## 2025-07-24 RX ORDER — CEPHALEXIN 500 MG/1
500 CAPSULE ORAL EVERY 8 HOURS
Qty: 21 CAPSULE | Refills: 0 | Status: SHIPPED | OUTPATIENT
Start: 2025-07-24 | End: 2025-07-24

## 2025-07-24 RX ORDER — CEPHALEXIN 500 MG/1
500 CAPSULE ORAL EVERY 8 HOURS
Qty: 21 CAPSULE | Refills: 0 | Status: SHIPPED | OUTPATIENT
Start: 2025-07-24 | End: 2025-07-31

## 2025-07-24 RX ORDER — CLOTRIMAZOLE AND BETAMETHASONE DIPROPIONATE 10; .64 MG/G; MG/G
CREAM TOPICAL 2 TIMES DAILY
Qty: 45 G | Refills: 0 | Status: SHIPPED | OUTPATIENT
Start: 2025-07-24

## 2025-07-26 LAB — BACTERIA UR CULT: ABNORMAL

## 2025-07-29 ENCOUNTER — LAB VISIT (OUTPATIENT)
Dept: LAB | Facility: HOSPITAL | Age: 85
End: 2025-07-29
Attending: UROLOGY
Payer: MEDICARE

## 2025-07-29 DIAGNOSIS — E11.9 DIABETES MELLITUS WITHOUT COMPLICATION: ICD-10-CM

## 2025-07-29 LAB
EAG (OHS): 157 MG/DL (ref 68–131)
HBA1C MFR BLD: 7.1 % (ref 4–5.6)

## 2025-07-29 PROCEDURE — 36415 COLL VENOUS BLD VENIPUNCTURE: CPT | Mod: PO

## 2025-07-29 PROCEDURE — 83036 HEMOGLOBIN GLYCOSYLATED A1C: CPT

## 2025-09-04 ENCOUNTER — OFFICE VISIT (OUTPATIENT)
Dept: UROLOGY | Facility: CLINIC | Age: 85
End: 2025-09-04
Payer: MEDICARE

## 2025-09-04 DIAGNOSIS — R35.1 NOCTURIA MORE THAN TWICE PER NIGHT: ICD-10-CM

## 2025-09-04 DIAGNOSIS — N13.8 BPH WITH OBSTRUCTION/LOWER URINARY TRACT SYMPTOMS: Primary | ICD-10-CM

## 2025-09-04 DIAGNOSIS — N47.1 PHIMOSIS: ICD-10-CM

## 2025-09-04 DIAGNOSIS — N40.1 BPH WITH OBSTRUCTION/LOWER URINARY TRACT SYMPTOMS: Primary | ICD-10-CM

## 2025-09-04 PROCEDURE — 1160F RVW MEDS BY RX/DR IN RCRD: CPT | Mod: CPTII,S$GLB,, | Performed by: UROLOGY

## 2025-09-04 PROCEDURE — 1159F MED LIST DOCD IN RCRD: CPT | Mod: CPTII,S$GLB,, | Performed by: UROLOGY

## 2025-09-04 PROCEDURE — 1101F PT FALLS ASSESS-DOCD LE1/YR: CPT | Mod: CPTII,S$GLB,, | Performed by: UROLOGY

## 2025-09-04 PROCEDURE — 99213 OFFICE O/P EST LOW 20 MIN: CPT | Mod: S$GLB,,, | Performed by: UROLOGY

## 2025-09-04 PROCEDURE — 1126F AMNT PAIN NOTED NONE PRSNT: CPT | Mod: CPTII,S$GLB,, | Performed by: UROLOGY

## 2025-09-04 PROCEDURE — 3288F FALL RISK ASSESSMENT DOCD: CPT | Mod: CPTII,S$GLB,, | Performed by: UROLOGY

## 2025-09-04 PROCEDURE — 99999 PR PBB SHADOW E&M-EST. PATIENT-LVL II: CPT | Mod: PBBFAC,,, | Performed by: UROLOGY

## 2025-09-04 RX ORDER — ERGOCALCIFEROL 1.25 MG/1
1000 CAPSULE ORAL
COMMUNITY

## 2025-09-04 RX ORDER — CLOTRIMAZOLE AND BETAMETHASONE DIPROPIONATE 10; .64 MG/G; MG/G
CREAM TOPICAL 2 TIMES DAILY
Qty: 45 G | Refills: 1 | Status: SHIPPED | OUTPATIENT
Start: 2025-09-04